# Patient Record
Sex: MALE | Race: WHITE | NOT HISPANIC OR LATINO | Employment: UNEMPLOYED | URBAN - METROPOLITAN AREA
[De-identification: names, ages, dates, MRNs, and addresses within clinical notes are randomized per-mention and may not be internally consistent; named-entity substitution may affect disease eponyms.]

---

## 2023-10-27 ENCOUNTER — OFFICE VISIT (OUTPATIENT)
Dept: PULMONOLOGY | Facility: CLINIC | Age: 58
End: 2023-10-27
Payer: MEDICAID

## 2023-10-27 VITALS
SYSTOLIC BLOOD PRESSURE: 148 MMHG | TEMPERATURE: 97.5 F | WEIGHT: 128.2 LBS | BODY MASS INDEX: 21.36 KG/M2 | HEIGHT: 65 IN | OXYGEN SATURATION: 98 % | HEART RATE: 77 BPM | DIASTOLIC BLOOD PRESSURE: 89 MMHG

## 2023-10-27 DIAGNOSIS — R91.1 LUNG NODULE: ICD-10-CM

## 2023-10-27 DIAGNOSIS — G47.34 NOCTURNAL HYPOXIA: ICD-10-CM

## 2023-10-27 DIAGNOSIS — J43.9 PULMONARY EMPHYSEMA, UNSPECIFIED EMPHYSEMA TYPE (MULTI): Primary | ICD-10-CM

## 2023-10-27 DIAGNOSIS — F17.219 CIGARETTE NICOTINE DEPENDENCE WITH NICOTINE-INDUCED DISORDER: ICD-10-CM

## 2023-10-27 DIAGNOSIS — R63.4 WEIGHT LOSS: ICD-10-CM

## 2023-10-27 DIAGNOSIS — R06.09 DYSPNEA ON EXERTION: ICD-10-CM

## 2023-10-27 PROBLEM — R07.9 CHEST PAIN: Status: ACTIVE | Noted: 2023-10-27

## 2023-10-27 PROBLEM — G47.10 HYPERSOMNIA: Status: ACTIVE | Noted: 2023-10-27

## 2023-10-27 PROBLEM — M79.89 SWELLING OF LOWER EXTREMITY: Status: ACTIVE | Noted: 2023-03-15

## 2023-10-27 PROBLEM — R93.89 ABNORMAL CHEST CT: Status: ACTIVE | Noted: 2023-10-27

## 2023-10-27 PROBLEM — R10.31 SEVERE RIGHT GROIN PAIN: Status: ACTIVE | Noted: 2023-10-27

## 2023-10-27 PROBLEM — M75.82 ROTATOR CUFF TENDINITIS, LEFT: Status: ACTIVE | Noted: 2023-10-27

## 2023-10-27 PROBLEM — J98.4 CAVITARY LESION OF LUNG: Status: ACTIVE | Noted: 2023-10-27

## 2023-10-27 PROBLEM — R06.02 SHORTNESS OF BREATH AT REST: Status: ACTIVE | Noted: 2023-10-27

## 2023-10-27 PROBLEM — G47.30 SLEEP-DISORDERED BREATHING: Status: ACTIVE | Noted: 2023-10-27

## 2023-10-27 PROBLEM — M54.32 SCIATICA OF LEFT SIDE: Status: ACTIVE | Noted: 2023-10-27

## 2023-10-27 PROBLEM — G47.33 OSA (OBSTRUCTIVE SLEEP APNEA): Status: ACTIVE | Noted: 2023-10-27

## 2023-10-27 PROBLEM — F17.210 CIGARETTE SMOKER: Status: ACTIVE | Noted: 2023-10-27

## 2023-10-27 PROBLEM — F17.200 NICOTINE ADDICTION: Status: ACTIVE | Noted: 2023-10-27

## 2023-10-27 PROCEDURE — 99215 OFFICE O/P EST HI 40 MIN: CPT | Performed by: INTERNAL MEDICINE

## 2023-10-27 RX ORDER — DIAZEPAM 2 MG/1
2 TABLET ORAL DAILY PRN
COMMUNITY
Start: 2023-10-24

## 2023-10-27 RX ORDER — KETOROLAC TROMETHAMINE 5 MG/ML
SOLUTION OPHTHALMIC
COMMUNITY
Start: 2021-05-05 | End: 2024-02-29 | Stop reason: WASHOUT

## 2023-10-27 RX ORDER — MONTELUKAST SODIUM 10 MG/1
10 TABLET ORAL NIGHTLY
Qty: 30 TABLET | Refills: 1 | Status: SHIPPED | OUTPATIENT
Start: 2023-10-27 | End: 2023-12-26

## 2023-10-27 RX ORDER — MIRTAZAPINE 15 MG/1
15 TABLET, FILM COATED ORAL NIGHTLY
COMMUNITY
Start: 2023-10-24 | End: 2024-02-29 | Stop reason: WASHOUT

## 2023-10-27 RX ORDER — IBUPROFEN 600 MG/1
600 TABLET ORAL 3 TIMES DAILY PRN
COMMUNITY
End: 2024-02-12 | Stop reason: SDUPTHER

## 2023-10-27 RX ORDER — ROPINIROLE 1 MG/1
1 TABLET, FILM COATED ORAL 3 TIMES DAILY
COMMUNITY
Start: 2023-05-27 | End: 2024-02-29 | Stop reason: WASHOUT

## 2023-10-27 RX ORDER — MICONAZOLE NITRATE 2 %
CREAM (GRAM) TOPICAL
COMMUNITY
Start: 2023-06-13 | End: 2024-02-29 | Stop reason: WASHOUT

## 2023-10-27 RX ORDER — MONTELUKAST SODIUM 10 MG/1
1 TABLET ORAL NIGHTLY
COMMUNITY
End: 2023-10-27 | Stop reason: SDUPTHER

## 2023-10-27 RX ORDER — DIAZEPAM 5 MG/1
TABLET ORAL
COMMUNITY
Start: 2022-09-15 | End: 2024-02-29 | Stop reason: WASHOUT

## 2023-10-27 RX ORDER — NITROGLYCERIN 0.4 MG/1
TABLET SUBLINGUAL
COMMUNITY

## 2023-10-27 RX ORDER — TIOTROPIUM BROMIDE 18 UG/1
1 CAPSULE ORAL; RESPIRATORY (INHALATION)
COMMUNITY

## 2023-10-27 RX ORDER — IBUPROFEN 800 MG/1
1 TABLET ORAL
COMMUNITY
Start: 2023-10-24

## 2023-10-27 RX ORDER — GABAPENTIN 300 MG/1
CAPSULE ORAL
COMMUNITY
Start: 2022-04-12 | End: 2024-02-29 | Stop reason: WASHOUT

## 2023-10-27 RX ORDER — ALBUTEROL SULFATE 0.83 MG/ML
2.5 SOLUTION RESPIRATORY (INHALATION) 4 TIMES DAILY PRN
COMMUNITY
Start: 2023-02-17 | End: 2024-01-31 | Stop reason: SDUPTHER

## 2023-10-27 RX ORDER — BUDESONIDE AND FORMOTEROL FUMARATE DIHYDRATE 80; 4.5 UG/1; UG/1
2 AEROSOL RESPIRATORY (INHALATION) 2 TIMES DAILY
COMMUNITY
End: 2023-10-27 | Stop reason: SDUPTHER

## 2023-10-27 RX ORDER — ALBUTEROL SULFATE 90 UG/1
1-2 AEROSOL, METERED RESPIRATORY (INHALATION)
COMMUNITY
End: 2023-10-27 | Stop reason: SDUPTHER

## 2023-10-27 RX ORDER — ACETAMINOPHEN 500 MG
2 TABLET ORAL 3 TIMES DAILY
COMMUNITY
Start: 2022-02-16 | End: 2024-03-11 | Stop reason: HOSPADM

## 2023-10-27 RX ORDER — METHOCARBAMOL 500 MG/1
1 TABLET, FILM COATED ORAL 3 TIMES DAILY PRN
COMMUNITY
Start: 2022-04-12 | End: 2024-02-29 | Stop reason: WASHOUT

## 2023-10-27 RX ORDER — CITALOPRAM 10 MG/1
10 TABLET ORAL DAILY
COMMUNITY
Start: 2023-05-26 | End: 2024-02-29 | Stop reason: WASHOUT

## 2023-10-27 RX ORDER — BUDESONIDE AND FORMOTEROL FUMARATE DIHYDRATE 160; 4.5 UG/1; UG/1
2 AEROSOL RESPIRATORY (INHALATION)
COMMUNITY
End: 2023-10-27 | Stop reason: SDUPTHER

## 2023-10-27 RX ORDER — DM/P-EPHED/ACETAMINOPH/DOXYLAM 30-7.5/3
LIQUID (ML) ORAL
COMMUNITY
End: 2024-02-29 | Stop reason: WASHOUT

## 2023-10-27 RX ORDER — ALBUTEROL SULFATE 90 UG/1
1-2 AEROSOL, METERED RESPIRATORY (INHALATION) EVERY 6 HOURS PRN
Qty: 18 G | Refills: 3 | Status: SHIPPED | OUTPATIENT
Start: 2023-10-27 | End: 2024-01-31 | Stop reason: SDUPTHER

## 2023-10-27 RX ORDER — BUDESONIDE AND FORMOTEROL FUMARATE DIHYDRATE 160; 4.5 UG/1; UG/1
2 AEROSOL RESPIRATORY (INHALATION)
Qty: 30 EACH | Refills: 3 | Status: SHIPPED | OUTPATIENT
Start: 2023-10-27 | End: 2024-02-29

## 2023-10-27 RX ORDER — GABAPENTIN 100 MG/1
CAPSULE ORAL
COMMUNITY
Start: 2022-03-09 | End: 2024-02-29 | Stop reason: WASHOUT

## 2023-10-27 ASSESSMENT — ENCOUNTER SYMPTOMS
APPETITE CHANGE: 0
NERVOUS/ANXIOUS: 0
CONFUSION: 0
CHEST TIGHTNESS: 1
PALPITATIONS: 0
WHEEZING: 1
DYSPHORIC MOOD: 0
RHINORRHEA: 0
NAUSEA: 0
BACK PAIN: 1
ARTHRALGIAS: 1
UNEXPECTED WEIGHT CHANGE: 1
EYE ITCHING: 0
SINUS PAIN: 0
EYE REDNESS: 0
SEIZURES: 0
COUGH: 1
SHORTNESS OF BREATH: 1
EYE PAIN: 0
CONSTIPATION: 0
LIGHT-HEADEDNESS: 1
HEMATURIA: 0
FREQUENCY: 0
WOUND: 0
FATIGUE: 1
NECK PAIN: 1
DYSURIA: 0
ABDOMINAL PAIN: 0
SORE THROAT: 0
HEADACHES: 0
CHILLS: 0
MYALGIAS: 0
FEVER: 0
DIARRHEA: 0
VOMITING: 0
DIZZINESS: 0
SINUS PRESSURE: 0

## 2023-10-27 ASSESSMENT — PAIN SCALES - GENERAL: PAINLEVEL: 0-NO PAIN

## 2023-10-27 NOTE — PATIENT INSTRUCTIONS
Mr. Crawley,     It was a pleasure to talk to you today. We discussed the followings:     1. Lung mass/nodule: given it has minimal activity on PET scan and it was stable in size in your repeat chest CT for a while. However your repeat chest CT in 9/2022 showed enlarging nodule. You subsequently had a biopsy done that did not show any cancer. For now we will monitor it with chest CTs. Next in October 2023.      2. COPD: your breathing test showed that you have moderate COPD. Given your symptoms are not well controlled, please make sure you will attend the pulmonary rehab. Your six minute walk test result showed that you did not qualify for oxygen supplementation. Please continue Symbicort, Spiriva, Montelukast and Ventolin as needed.      3. Smoking: please make every effort to quit smoking. To help with that, I started you on nicotine gum.      4. Chest pain: please make sure you are following up with a heart doctor.     5. Low oxygen level: please continue to wear your supplemental oxygen at nights.      6. Weight loss: discuss this with your primary care.     Please return to the clinic in 3 months.

## 2023-10-27 NOTE — PROGRESS NOTES
Subjective   58 YOM with h/o hernia, pulmonary emphysema/blebs s/p resection in 2000, now is being seen for lung nodules.   Patient had not seen a doctor in ten years until he saw Dr. Barboza. Had screening chest CT done in Sep 2020 that showed a ? lung nodules, subsequently had PET done that showed mild activity, for that is referred to my office. Patient subsequently had more work up done as detailed below.   Last seen 4 months ago, no major events since then.   Patient had nocturnal pulse oximetry done, qualified for nocturnal O2 therapy. Using it every night, with improvement of his symptoms.   Currently on Symbicort, Spiriva and albuterol. Continues to use his albuterol on several times a day.   Overall is feeling the same as the last visit.   CC SOB, both at rest and with activity, but mostly activity. LEDEZMA after walking 50 feet without oxygen or climbing 3-4 steps. +ve orthopnea. +ve PND, several times every night. No LE edema. LEDEZMA started 2019, gradual onset, progressive. Associated with chest tightness and wheezing. LEDEZMA worse since the last visit.   +ve wheezing, several times a day. States wheezing improves with albuterol.  Wheezing stable since the last visit.   +ve chronic cough, moderate, mostly in the morning, productive of yellow sputum, no h/o hemoptysis. Cough and sputum for years without recent changes.      Having problem falling sleep and staying sleep. +ve snoring, Does not feel rested in am.      Exposures: quit smoking 7/2021, but now smokes 2 cigarettes. Around 40-60 pack/year h/o smoking. 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     Review of Systems   Constitutional:  Positive for fatigue and unexpected weight change. Negative for appetite change, chills and fever.        +ve weight loss.    HENT:  Negative for congestion, ear pain, postnasal drip, rhinorrhea, sinus pressure, sinus pain and sore throat.    Eyes:  Negative for pain, redness, itching and visual disturbance.    Respiratory:  Positive for cough, chest tightness, shortness of breath and wheezing.    Cardiovascular:  Positive for chest pain. Negative for palpitations and leg swelling.   Gastrointestinal:  Negative for abdominal pain, constipation, diarrhea, nausea and vomiting.   Genitourinary:  Negative for dysuria, frequency and hematuria.   Musculoskeletal:  Positive for arthralgias, back pain and neck pain. Negative for myalgias.   Skin:  Negative for pallor, rash and wound.   Neurological:  Positive for light-headedness. Negative for dizziness, seizures, syncope and headaches.   Psychiatric/Behavioral:  Negative for confusion and dysphoric mood. The patient is not nervous/anxious.          Current Outpatient Medications   Medication Instructions    acetaminophen (Tylenol) 500 mg tablet 2 tablets, oral, 3 times daily    albuterol 2.5 mg, nebulization, 4 times daily PRN    budesonide-formoteroL (Symbicort) 160-4.5 mcg/actuation inhaler 2 puffs, inhalation, 2 times daily RT, RINSE MOUTH AFTER USE. GO TO EMERGENCY ROOM IF CHEST PAIN OR PALPITATIONS OR FEELING WORSE OR CON<BR>    budesonide-formoteroL (Symbicort) 80-4.5 mcg/actuation inhaler 2 puffs, inhalation, 2 times daily    citalopram (CELEXA) 10 mg, oral, Daily    diazePAM (Valium) 5 mg tablet oral, TAKE 1 TABLET 1 HOUR PRIOR TO PROCEDURE.<BR>    diazePAM (VALIUM) 2 mg, oral, Daily PRN    gabapentin (Neurontin) 100 mg capsule oral    gabapentin (Neurontin) 300 mg capsule oral     mg tablet 1 tablet, oral, 3 times daily with meals, prn     mg, oral, 3 times daily PRN    ketorolac (Acular) 0.5 % ophthalmic solution ophthalmic (eye)    methocarbamol (Robaxin) 500 mg tablet 1 tablet, oral, 3 times daily PRN    mirtazapine (REMERON) 15 mg, oral, Nightly    montelukast (Singulair) 10 mg tablet 1 tablet, oral, Nightly, STOP IF MOOD CHANGES.    nicotine polacrilex (Commit) 2 mg lozenge ALLOW 1 LOZENGE TO DISSOLVE SLOWLY IN MOUTH AS DIRECTED    nicotine  polacrilex (Nicorette) 2 mg gum CHEW 1 PIECE SLOWLY AND INTERMITTENTLY FOR 30 MINUTES REPEAT EVERY 1 TO 2 HOURS; MAXIMUM OF 24 PIECES A DAY    nitroglycerin (Nitrostat) 0.4 mg SL tablet DISSOLVE ONE TABLET UNDER TONGUE every 5 minutes for up to 3 doses as needed for chest pain. call 911 if pain persists.<BR>    rOPINIRole (REQUIP) 1 mg, oral, 3 times daily    Spiriva with HandiHaler 18 mcg inhalation capsule 1 capsule, inhalation, Daily RT, GO TO ER IF VISUAL/EYE PAIN OR URINARY RETENTION OR PALPITATIONS OR CHEST PAIN OR    Ventolin HFA 90 mcg/actuation inhaler 1-2 puffs, inhalation, EVERY 4 TO 6 HOURS AS NEEDED<BR>      Objective     Physical Exam  Constitutional:       Appearance: Normal appearance.      Comments: Thin.    HENT:      Head: Normocephalic and atraumatic.      Nose:      Comments: On RA.      Mouth/Throat:      Mouth: Mucous membranes are moist.      Comments: Mallampati 2-3  Eyes:      Extraocular Movements: Extraocular movements intact.      Pupils: Pupils are equal, round, and reactive to light.   Cardiovascular:      Rate and Rhythm: Normal rate and regular rhythm.      Heart sounds: Normal heart sounds. No murmur heard.     No friction rub. No gallop.   Pulmonary:      Effort: Pulmonary effort is normal. No respiratory distress.      Breath sounds: No wheezing or rales.      Comments: Clear lung fields b/l with fair air entry.   Abdominal:      General: There is no distension.      Palpations: Abdomen is soft.      Tenderness: There is no abdominal tenderness. There is no guarding.   Musculoskeletal:         General: No swelling or tenderness. Normal range of motion.      Cervical back: Normal range of motion and neck supple. No rigidity or tenderness.      Right lower leg: No edema.      Left lower leg: No edema.   Lymphadenopathy:      Cervical: No cervical adenopathy.   Skin:     General: Skin is warm and dry.      Coloration: Skin is not jaundiced.   Neurological:      General: No focal  "deficit present.      Mental Status: He is alert and oriented to person, place, and time.      Cranial Nerves: No cranial nerve deficit.      Motor: No weakness.   Psychiatric:         Mood and Affect: Mood normal.         Behavior: Behavior normal.     Last Recorded Vitals  Blood pressure 148/89, pulse 77, temperature 36.4 °C (97.5 °F), height 1.651 m (5' 5\"), weight 58.2 kg (128 lb 3.2 oz), SpO2 98 %.    Relevant Results  I personally reviewed the images for the following studies (official reads as below):    CT from 4/10/2023 that showed: interval decrease in RUL cavitary lesion   CT from 3/2023 that showed: interval decrease in the RUL cavitary lesion and subpleural mass like lesion   CT from 2022 that showed worsening of the RUL cavitary lesion     6 MWT from 2022 reviewed, walked 340/477 (55.2%), no desaturation on RA.   I personally interpreted the PFT from 2022 that showed: FEV1/FVC 39%, FEV1 40-->47% (19%) with bronchodilator response, TLC 87%, RV/%, DLCO 56-->70%.   Stress test in 10/2022 negative.  Sleep study result from 2022 reviewed that showed RDI 3% 18.4%, RDI 4% 9.4%, emelia O2 80s.    Assessment/Plan     58 YOM with h/o bullectomy, smoking, now is being seen for LEDEZMA and lung mass/nodule.      1. Lung mass/nodule: ? scar. Bullectomy both lungs in . 2.9 x 1.7 cm right apex mass/speculated and an 8 mm RUL nodule, both only mild uptake on PET. Patient is high risk given h/o smoking and recent h/o weight loss. Also father  of lung cancer. CT chest done, that showed stable nodule and likely scar tissue. Repeat chest CT in 2021 showed enlarging RUL nodule. PET increased uptake, s/p bronch + biopsy that showed chronic inflammation with occasional not necrotizing granuloma. Patient had repeat chest CT done in Sep 2022 that showed increased in size of the cavity. Subsequently was referred to thoracic surgery who ordered a CT guided biopsy which was done in 2022. Cultures +ve " for pseudomonas, but otherwise negative. Cytology negative. Pathology showed dense fibroadipose tissue with chronic inflammation. Was treated with Levaquin for 2 weeks. Repeat CTs in March and April 2023 showed continuous improvement.    will repeat chest CT now.      2. COPD: PFT showed moderate COPD. Significant air trapping, marked LEDEZMA, on LAMA + LABA + ICS and rescue inhaler. Rescue inhaler need is now very frequent. Symptoms stable since the last visit. CAT today 32. Category B. Started on Anoro previously, but could not afford due to high Co-payment. Now back on Symbicort + Spiriva.        Continue Symbicort, Spiriva and Singulair   continue Ventolin as needed.    6 MWT done, no desaturation   nocturnal pulse O2 done, qualified for nocturnal oxygen, advised to continue to wear   pulmonary rehab referral done, has not attended yet. Will re-order.      3. Smoking: quit smoking July 2021, but now started again. Tried nicotine patch without any effect. Smoking two cigarettes a day currently    smoking cessation done, total time 3 min   started on nicotine gums, will continue      4. LEDEZMA: likely from COPD, had cardiac work up done.    2D echocardiogram done, only showed dilated LA   stress test in 10/2022 also negative.      5. Weight loss: ? cause, pulmonary work up negative for cancer.    advised to discuss this with his primary care.      RTC in 2-3 months.     Sully Carlin MD

## 2023-11-13 ENCOUNTER — HOSPITAL ENCOUNTER (OUTPATIENT)
Dept: RADIOLOGY | Facility: HOSPITAL | Age: 58
Discharge: HOME | End: 2023-11-13
Payer: MEDICAID

## 2023-11-13 DIAGNOSIS — F17.219 CIGARETTE NICOTINE DEPENDENCE WITH NICOTINE-INDUCED DISORDER: ICD-10-CM

## 2023-11-13 PROCEDURE — 71250 CT THORAX DX C-: CPT | Performed by: RADIOLOGY

## 2023-11-13 PROCEDURE — 71250 CT THORAX DX C-: CPT

## 2023-11-14 ENCOUNTER — TELEPHONE (OUTPATIENT)
Dept: PULMONOLOGY | Facility: CLINIC | Age: 58
End: 2023-11-14
Payer: MEDICAID

## 2023-11-14 ENCOUNTER — ANCILLARY PROCEDURE (OUTPATIENT)
Dept: RADIOLOGY | Facility: CLINIC | Age: 58
End: 2023-11-14
Payer: MEDICAID

## 2023-11-14 DIAGNOSIS — M25.512 PAIN IN LEFT SHOULDER: ICD-10-CM

## 2023-11-14 PROCEDURE — 73030 X-RAY EXAM OF SHOULDER: CPT | Mod: LT,FY

## 2023-11-14 PROCEDURE — 73030 X-RAY EXAM OF SHOULDER: CPT | Mod: LEFT SIDE | Performed by: RADIOLOGY

## 2023-11-14 NOTE — TELEPHONE ENCOUNTER
Davi from [facility] is requesting an order for nocturnal testing for patient. It is time for his annual renewal of oxygen and this is a requirement. His last appointment with you was on 10/27/23. Once you  place the order in [facility] I will get it sent over to the company.

## 2023-11-15 DIAGNOSIS — G47.34 NOCTURNAL HYPOXIA: Primary | ICD-10-CM

## 2023-12-19 ENCOUNTER — OFFICE VISIT (OUTPATIENT)
Dept: ORTHOPEDIC SURGERY | Facility: HOSPITAL | Age: 58
End: 2023-12-19
Payer: MEDICAID

## 2023-12-19 VITALS — WEIGHT: 130 LBS | BODY MASS INDEX: 21.66 KG/M2 | HEIGHT: 65 IN

## 2023-12-19 DIAGNOSIS — M25.512 LEFT SHOULDER PAIN, UNSPECIFIED CHRONICITY: Primary | ICD-10-CM

## 2023-12-19 DIAGNOSIS — M75.102 TEAR OF LEFT ROTATOR CUFF, UNSPECIFIED TEAR EXTENT, UNSPECIFIED WHETHER TRAUMATIC: ICD-10-CM

## 2023-12-19 PROCEDURE — 99204 OFFICE O/P NEW MOD 45 MIN: CPT | Performed by: NURSE PRACTITIONER

## 2023-12-19 PROCEDURE — 99214 OFFICE O/P EST MOD 30 MIN: CPT | Performed by: NURSE PRACTITIONER

## 2023-12-19 RX ORDER — MELOXICAM 7.5 MG/1
7.5 TABLET ORAL DAILY
Qty: 30 TABLET | Refills: 0 | Status: SHIPPED | OUTPATIENT
Start: 2023-12-19 | End: 2024-01-18

## 2023-12-19 ASSESSMENT — PAIN - FUNCTIONAL ASSESSMENT: PAIN_FUNCTIONAL_ASSESSMENT: 0-10

## 2023-12-19 ASSESSMENT — PAIN SCALES - GENERAL: PAINLEVEL_OUTOF10: 10 - WORST POSSIBLE PAIN

## 2023-12-19 ASSESSMENT — PAIN DESCRIPTION - DESCRIPTORS: DESCRIPTORS: ACHING;NUMBNESS;STABBING

## 2023-12-19 NOTE — PROGRESS NOTES
Provider Impression/Assessment:  Patient with increased left shoulder pain and dysfunction for the last 8 weeks with a history of left shoulder issues. They now have acute increased pain with a  change in his shoulder mobility.     Patient Discussion/Plan:  At this point we had a long discussion about their options. Even at their age we know that early detection and treatment of rotator cuff tears results in better outcomes both surgically and clinically. They have weakness that is increased over the last 8 weeks and have not regained range of motion. We could consider cortisone injections but we do not want to miss an acute rotator cuff tear. We know from the literature that outcomes and healing rates are much better when done immediately. As a result, we are recommending an  MRI to evaluate the rotator cuff on their left side. This will allow us to better plan for what procedure and treatment would be best for the patient, whether it be a non-operative or operative approach.     We do not want to miss anything structurally around his shoulder and his rotator cuff. Patient is going to continue exercises at this time while waiting to schedule advanced imaging. In the meantime, they are encouraged to do exercises at home for the shoulder. They were given our hand out with exercises today. These exercises should be done every day. They are aware to follow up with our office after the MRI is completed and we will formulate a plan. We will discuss options on the phone after imaging completed. They know to call our office once the imaging is complete.    We recommended that you completely quit smoking for both your pain and healing and your health in general. Patient understands that he should stop smoking prior to any consideration of a surgery.    All of their questions were answered today to their satisfaction and are in agreement with the above plan. They know how to reach the office if there are any additional  questions prior to being seen again. Patient was discussed with Dr Herrera and he agrees with the above plan.     Should you have any questions or concerns after your visit today, please do not hesitate to call the office at [phone]. We strive to give you high-quality, patient-centered, collaborative care and I am honored to be a part of your health care team.      Should you have any questions or concerns after your visit today, please do not hesitate to call the office at [phone]. We strive to give you high-quality, patient-centered, collaborative care and I am honored to be a part of your health care team.    ----------------------------------------------------------------------------------------------------------  CHIEF COMPLAINT:  NPV LEFT SHOULDER     History of Present Illness:  Alber Young is a pleasant 58 y.o. RIGHT hand dominant male who currently works as a  and worked in [employer] for many years prior to that. He is presenting to clinic with left shoulder pain for the last 2 MONTHS. Pain first noticed 5 years ago. He denies recent trauma. Cannot recall every having a specific injury. When he first noticed the pain he had injections for his shoulder which did help initially. He has used Icy/Hot topical cream. Taken Ibuprofen.        ALBER YOUNG is in clinic today with left shoulder pain. He has been having this pain for the past 5 years and it has gradually worsened over time; no specific MYRON. Alber states that he is unable to sleep due to the pain. He is lacking ROM and strength in his left arm and reports intermittent paresthesia into his left hand. Alber's PCP ordered an XR for his left shoulder 11/14/23. In the past, Alber has had CSI and states that they have provided some pain relief. At this point, he would like to discuss surgery because he believes that his pain is no longer manageable. He denies any recent trauma or injury to the shoulder.     They HAVE NOT  tried any recent therapy. The shoulder has not really gotten any better. 70% of a normal shoulder.  7 out of 10 pain on average. 9 out of 10 at their worst. DOES wake them from sleep every night over the last 2 months now. DOES NOT feel unstable. DOES NOT feel stiff. No numbness or tingling. Taking over-the counter medications for pain relief. The opposite shoulder is normal, without complaint. Denies fevers or chills.     Past medical history, surgical history, social history and family history were all reviewed and are per the patient's health history questionnaire form which was filled out by the patient today. I have reviewed and it will be scanned into the patient chart. Family history was discussed and is not pertinent to the presenting problem.     Smoking Status: DAILY  Diabetic: NONE  Thyroid Disorder: NONE  BMI: 21    Allergies:  Allergies   Allergen Reactions    Morphine Itching     Past Medical History:   Diagnosis Date    Other specified health status 08/27/2020    No pertinent past medical history       Past Surgical History:   Procedure Laterality Date    CT GUIDED PERCUTANEOUS BIOPSY LUNG  11/10/2022    CT GUIDED PERCUTANEOUS BIOPSY LUNG 11/10/2022 [facility]    OTHER SURGICAL HISTORY  09/27/2022    Bronchoscopy     Social History     Socioeconomic History    Marital status:      Spouse name: Not on file    Number of children: Not on file    Years of education: Not on file    Highest education level: Not on file   Occupational History    Not on file   Tobacco Use    Smoking status: Every Day     Packs/day: .25     Types: Cigarettes    Smokeless tobacco: Never   Vaping Use    Vaping Use: Never used   Substance and Sexual Activity    Alcohol use: Yes    Drug use: Never    Sexual activity: Not on file   Other Topics Concern    Not on file   Social History Narrative    Not on file     Social Determinants of Health     Financial Resource Strain: Not on file   Food Insecurity: Not on file    Transportation Needs: Not on file   Physical Activity: Not on file   Stress: Not on file   Social Connections: Not on file   Intimate Partner Violence: Not on file   Housing Stability: Not on file     Review of Systems:   Review of systems for all 14 systems is negative for complaint except for the above noted       findings in the history of present illness.    Diagnoses/Problems:  Left shoulder pain  Left shoulder rotator cuff pathology    Physical Examination:  Patient is a well-developed, well nourished male in no acute distress. Awake, alert and oriented x3, with appropriate mood. Breathes with normal chest rises. Eye exam reveals round pupils with clear sclera. Gait is steady.    Patient had full range of motion of bilateral elbows. 5 out of 5 bilateral wrist flexion and extension. Thumb extension positive bilaterally. Sensation was intact to light touch to median, ulnar, radial, and axillary nerves bilaterally. Positive radial pulse bilaterally.     Examination of the right shoulder today reveals the skin to be intact. There is no sign any atrophy lesions or abrasions. There is no pain to palpation of the bony prominences. Range of motion of the right shoulder revealed 0-170° of forward elevation. 0-60° of external rotation. Internal rotation was to T12. Provocative maneuvers today were negative.    Examination of the left shoulder reveals the skin to be intact. No sign any atrophy lesions or abrasions. There is MODERATE tenderness with palpation along the anterior chromium and greater tuberosity.  Exam is limited secondary to pain and guarding a fair amount. Active range of motion is 90° of forward elevation, passively correctable with pain past 90 degrees. 60° of external rotation with anterior palpable pain. Internal rotation was to T12. Abduction to 90° with pain. Acromioclavicular joint is asymptomatic. Bicipital groove POSITIVE. Speeds test POSITIVE. Jobes test POSITIVE. Neers sign POSITIVE. Hortencia  sign POSITIVE.   Provocative maneuvers of the rotator cuff were POSITIVE. External resistance 2/5. Internal resistance 3/5.     Results/Imaging:  Independent review of the imaging today from 11/14/23 left shoulder shows no signs of any fracture, dislocation or arthritis. I personally spent time viewing digital images of the radiology testing with the patient. I explained the results to the patient, along with suggested explanation for follow up recommendations based on testing and clinical results.       *While intending to generate a timely document that accurately reflects the content of the visit, no guarantee can be provided that every grammatical or spelling mistake has been or will be identified or corrected. Thank you for your understanding.

## 2024-01-02 ENCOUNTER — APPOINTMENT (OUTPATIENT)
Dept: RADIOLOGY | Facility: HOSPITAL | Age: 59
End: 2024-01-02
Payer: MEDICAID

## 2024-01-03 ENCOUNTER — HOSPITAL ENCOUNTER (OUTPATIENT)
Dept: RADIOLOGY | Facility: HOSPITAL | Age: 59
Discharge: HOME | End: 2024-01-03
Payer: MEDICAID

## 2024-01-03 DIAGNOSIS — M25.512 LEFT SHOULDER PAIN, UNSPECIFIED CHRONICITY: ICD-10-CM

## 2024-01-03 DIAGNOSIS — M75.102 TEAR OF LEFT ROTATOR CUFF, UNSPECIFIED TEAR EXTENT, UNSPECIFIED WHETHER TRAUMATIC: ICD-10-CM

## 2024-01-03 PROCEDURE — 73221 MRI JOINT UPR EXTREM W/O DYE: CPT | Mod: LT

## 2024-01-09 ENCOUNTER — OFFICE VISIT (OUTPATIENT)
Dept: ORTHOPEDIC SURGERY | Facility: HOSPITAL | Age: 59
End: 2024-01-09
Payer: MEDICAID

## 2024-01-09 ENCOUNTER — HOSPITAL ENCOUNTER (OUTPATIENT)
Facility: HOSPITAL | Age: 59
Setting detail: OUTPATIENT SURGERY
End: 2024-01-09
Attending: ORTHOPAEDIC SURGERY | Admitting: ORTHOPAEDIC SURGERY
Payer: MEDICAID

## 2024-01-09 VITALS — BODY MASS INDEX: 21.66 KG/M2 | HEIGHT: 65 IN | WEIGHT: 130 LBS

## 2024-01-09 DIAGNOSIS — G89.18 ACUTE POST-OPERATIVE PAIN: ICD-10-CM

## 2024-01-09 DIAGNOSIS — Z01.818 PREOP EXAMINATION: ICD-10-CM

## 2024-01-09 DIAGNOSIS — M75.102 TEAR OF LEFT ROTATOR CUFF, UNSPECIFIED TEAR EXTENT, UNSPECIFIED WHETHER TRAUMATIC: Primary | ICD-10-CM

## 2024-01-09 PROCEDURE — 99214 OFFICE O/P EST MOD 30 MIN: CPT | Performed by: NURSE PRACTITIONER

## 2024-01-09 PROCEDURE — L3670 SO ACRO/CLAV CAN WEB PRE OTS: HCPCS | Performed by: NURSE PRACTITIONER

## 2024-01-09 RX ORDER — OXYCODONE AND ACETAMINOPHEN 5; 325 MG/1; MG/1
1 TABLET ORAL EVERY 6 HOURS PRN
Qty: 20 TABLET | Refills: 0 | Status: SHIPPED | OUTPATIENT
Start: 2024-01-09 | End: 2024-01-15

## 2024-01-09 ASSESSMENT — PAIN - FUNCTIONAL ASSESSMENT: PAIN_FUNCTIONAL_ASSESSMENT: 0-10

## 2024-01-09 ASSESSMENT — PAIN SCALES - GENERAL: PAINLEVEL_OUTOF10: 10 - WORST POSSIBLE PAIN

## 2024-01-09 ASSESSMENT — PAIN DESCRIPTION - DESCRIPTORS: DESCRIPTORS: ACHING

## 2024-01-09 NOTE — PROGRESS NOTES
Provider Impression/Assessment:  Patient with left shoulder pain consistent with small, full thickness rotator cuff tear and biceps tendinitis. Confirmed with recent MRI and clinical evaluation.       Patient Discussion/Plan:  VILMA is status post injury of their left shoulder. Recent MRI reveals rotator cuff tear. Patient had a normal shoulder prior to injury. Through the most recent literature we know that early detection and treatment of rotator cuff tears have a better outcome and function long term. They have failed conservative management at this point. The risks, benefits and alternatives of shoulder surgery were discussed at length with Dr Herrera today. The risks of surgery are infection, dislocation, nerve injury, blood loss. The benefits are pain relief and restoration of function. The patient verbalize an understanding of the risks, benefits, alternatives and the expected outcomes and wishes to proceed with elective shoulder surgery at this time. The rehabilitation after surgery was discussed at length today. Rehabilitation after rotator cuff repair lasting a total of 5-6 months after surgery. Lifting is slowly progressed. They will be in a sling for the first month. The following 2-3 months would work on range of motion. No strengthening until 3 months from surgery.     PLAN is for left shoulder arthroscopic decompression, debridement, rotator cuff repair with patch implant augmentation and open biceps tenodesis.    At this point we will plan for surgery on 2/21/24 at [facility]. We gave the patient a handout today on arthroscopic rotator cuff repair. This will be out-patient surgery. They were fitted for the post operative sling today. They will need to be seen and cleared by the Anesthesia team prior to surgery date.      We again recommend that you completely quit smoking for both your pain and healing and your health in general. Patient understands that he should stop smoking prior to  any consideration of a surgery.     The patient was seen and evaluated by Dr. Herrera today. The history, physical exam, explanation of the disorder, and discussion of treatment options was performed by Dr. Herrera with the patient. All of their questions were answered today to their satisfaction and are in agreement with the above plan. They know how to reach the office if there are any additional questions prior to being seen again.      Should you have any questions or concerns after your visit today, please do not hesitate to call the office at [phone]. We strive to give you high-quality, patient-centered, collaborative care and I am honored to be a part of your health care team.    ----------------------------------------------------------------------------------------------------------  CHIEF COMPLAINT:  FUV LEFT SHOULDER   Pre-operative evaluation     History of Present Illness:  Alber Young is a pleasant 58 y.o. RIGHT hand dominant male who currently works as a  and worked in [employer] for many years prior to that. He is presenting to clinic with left shoulder pain for the last 2 MONTHS. Pain first noticed 5 years ago. He denies recent trauma. Cannot recall every having a specific injury. When he first noticed the pain he had injections for his shoulder which did help initially. He has used Icy/Hot topical cream. Taken Ibuprofen.         12/19/23 - ALBER YOUNG is in clinic today with left shoulder pain. He has been having this pain for the past 5 years and it has gradually worsened over time; no specific MYRON. Alber states that he is unable to sleep due to the pain. He is lacking ROM and strength in his left arm and reports intermittent paresthesia into his left hand. Alber's PCP ordered an XR for his left shoulder 11/14/23. In the past, Alber has had CSI and states that they have provided some pain relief. At this point, he would like to discuss surgery because he believes that  his pain is no longer manageable. He denies any recent trauma or injury to the shoulder. They HAVE NOT tried any recent therapy. The shoulder has not really gotten any better. 70% of a normal shoulder.  7 out of 10 pain on average. 9 out of 10 at their worst. DOES wake them from sleep every night over the last 2 months now. DOES NOT feel unstable. DOES NOT feel stiff. No numbness or tingling. Taking over-the counter medications for pain relief. The opposite shoulder is normal, without complaint. Denies fevers or chills.     1/09/24  VILMA YOUNG returns to clinic for a preoperative planning visit. He is accompanied by his fiance today. He would like to schedule surgery for his left shoulder. He denies any new injury to his shoulder since his last visit. He has completed MRI of left shoulder since last visit. His pain level continues to be 8 out of 10 on average. He continues to try to stretch his shoulder passively daily. Denies numbness and tingling.      Past medical history, surgical history, social history and family history were all reviewed and are per the patient's health history questionnaire form which was filled out by the patient today. I have reviewed and it will be scanned into the patient chart. Family history was discussed and is not pertinent to the presenting problem.      Smoking Status: DAILY  Diabetic: NONE  Thyroid Disorder: NONE  BMI: 21     Allergies:       Allergies   Allergen Reactions    Morphine Itching      Medical History        Past Medical History:   Diagnosis Date    Other specified health status 08/27/2020     No pertinent past medical history            Surgical History         Past Surgical History:   Procedure Laterality Date    CT GUIDED PERCUTANEOUS BIOPSY LUNG   11/10/2022     CT GUIDED PERCUTANEOUS BIOPSY LUNG 11/10/2022 [facility]    OTHER SURGICAL HISTORY   09/27/2022     Bronchoscopy         Social History               Socioeconomic History    Marital status:         Spouse name: Not on file    Number of children: Not on file    Years of education: Not on file    Highest education level: Not on file   Occupational History    Not on file   Tobacco Use    Smoking status: Every Day       Packs/day: .25       Types: Cigarettes    Smokeless tobacco: Never   Vaping Use    Vaping Use: Never used   Substance and Sexual Activity    Alcohol use: Yes    Drug use: Never    Sexual activity: Not on file   Other Topics Concern    Not on file   Social History Narrative    Not on file      Social Determinants of Health      Financial Resource Strain: Not on file   Food Insecurity: Not on file   Transportation Needs: Not on file   Physical Activity: Not on file   Stress: Not on file   Social Connections: Not on file   Intimate Partner Violence: Not on file   Housing Stability: Not on file         Review of Systems:   Review of systems for all 14 systems is negative for complaint except for the above noted       findings in the history of present illness.     Diagnoses/Problems:  Left shoulder pain  Left shoulder rotator cuff pathology     Physical Examination:  Patient is a well-developed, well nourished male in no acute distress. Awake, alert and oriented x3, with appropriate mood. Breathes with normal chest rises. Eye exam reveals round pupils with clear sclera. Gait is steady.     Patient had full range of motion of bilateral elbows. 5 out of 5 bilateral wrist flexion and extension. Thumb extension positive bilaterally. Sensation was intact to light touch to median, ulnar, radial, and axillary nerves bilaterally. Positive radial pulse bilaterally.      Examination of the right shoulder today reveals the skin to be intact. There is no sign any atrophy lesions or abrasions. There is no pain to palpation of the bony prominences. Range of motion of the right shoulder revealed 0-170° of forward elevation. 0-60° of external rotation. Internal rotation was to T12. Provocative maneuvers today were  negative.     Examination of the left shoulder reveals the skin to be intact. No sign any atrophy lesions or abrasions. There is MODERATE tenderness with palpation along the anterior chromium and greater tuberosity.  Exam is limited secondary to pain and guarding a fair amount. Active range of motion is 90° of forward elevation, passively correctable with pain past 90 degrees. 60° of external rotation with anterior palpable pain. Internal rotation was to T12. Abduction to 90° with pain. Acromioclavicular joint is asymptomatic. Bicipital groove POSITIVE. Speeds test POSITIVE. Jobes test POSITIVE. Neers sign POSITIVE. Burnett sign POSITIVE. Provocative maneuvers of the rotator cuff were POSITIVE. External resistance 2/5. Internal resistance 3/5.     Results/Imaging:  Independent review of the left shoulder xrays from 11/14/23 left shoulder shows no signs of any fracture, dislocation or arthritis.    MRI of left shoulder reveals a small full thickness tear. I personally spent time viewing digital images of the radiology testing with the patient. I explained the results to the patient, along with suggested explanation for follow up recommendations based on testing and clinical results.     Patient was prescribed a shoulder sling for postoperative care. The patient will have weakness, instability and/or deformity of their left arm which requires stabilization from this orthosis to improve their function after surgery.     Verbal and written instructions for the use, wear schedule, cleaning and application of this item were given. Patient was instructed that should the brace result in increased pain, decreased sensation, increased swelling, or an overall worsening of their medical condition, to please contact our office immediately at [phone].     Orthotic management and training was provided for skin care, modifications due to healing tissues, edema changes, interruption in skin integrity, and safety precautions  with the orthosis.    *Patient is status post acute shoulder injury. He presents today with continued 8 out of 10 pain and are still in the acute phase of injury. We prescribed a one time Percocet percocet prescription for their acute pain. They are to use it sparingly, alternating with extra strength Tylenol. Patient verbalized an understanding. I have personally obtained and reviewed the OARRS report on this patient. This report is part of the electronic medical record which will be scanned to the chart. I have considered the risks of abuse, dependence, addiction and diversion. I believe that it is clinically appropriate for this patient to be prescribed this medication at this time due to current symptoms and documented diagnosis. Patient is scheduled for left shoulder surgery.     *While intending to generate a timely document that accurately reflects the content of the visit, no guarantee can be provided that every grammatical or spelling mistake has been or will be identified or corrected. Thank you for your understanding.

## 2024-01-18 DIAGNOSIS — M75.122 COMPLETE TEAR OF LEFT ROTATOR CUFF, UNSPECIFIED WHETHER TRAUMATIC: Primary | ICD-10-CM

## 2024-01-19 DIAGNOSIS — Z01.818 PRE-OP EVALUATION: Primary | ICD-10-CM

## 2024-01-30 ENCOUNTER — APPOINTMENT (OUTPATIENT)
Dept: ORTHOPEDIC SURGERY | Facility: HOSPITAL | Age: 59
End: 2024-01-30
Payer: MEDICAID

## 2024-01-31 ENCOUNTER — OFFICE VISIT (OUTPATIENT)
Dept: PULMONOLOGY | Facility: CLINIC | Age: 59
End: 2024-01-31
Payer: MEDICAID

## 2024-01-31 VITALS
RESPIRATION RATE: 16 BRPM | HEIGHT: 65 IN | SYSTOLIC BLOOD PRESSURE: 138 MMHG | OXYGEN SATURATION: 97 % | HEART RATE: 112 BPM | DIASTOLIC BLOOD PRESSURE: 78 MMHG | BODY MASS INDEX: 20.43 KG/M2 | WEIGHT: 122.6 LBS | TEMPERATURE: 98.2 F

## 2024-01-31 DIAGNOSIS — R91.1 LUNG NODULE: Primary | ICD-10-CM

## 2024-01-31 DIAGNOSIS — J98.4 CAVITARY LESION OF LUNG: ICD-10-CM

## 2024-01-31 DIAGNOSIS — R06.09 DYSPNEA ON EXERTION: ICD-10-CM

## 2024-01-31 DIAGNOSIS — R63.4 WEIGHT LOSS: ICD-10-CM

## 2024-01-31 DIAGNOSIS — J43.9 PULMONARY EMPHYSEMA, UNSPECIFIED EMPHYSEMA TYPE (MULTI): ICD-10-CM

## 2024-01-31 PROCEDURE — 1036F TOBACCO NON-USER: CPT | Performed by: INTERNAL MEDICINE

## 2024-01-31 PROCEDURE — 99215 OFFICE O/P EST HI 40 MIN: CPT | Performed by: INTERNAL MEDICINE

## 2024-01-31 RX ORDER — ALBUTEROL SULFATE 90 UG/1
1-2 AEROSOL, METERED RESPIRATORY (INHALATION) EVERY 6 HOURS PRN
Qty: 18 G | Refills: 3 | Status: SHIPPED | OUTPATIENT
Start: 2024-01-31 | End: 2024-04-12 | Stop reason: SDUPTHER

## 2024-01-31 RX ORDER — ALBUTEROL SULFATE 0.83 MG/ML
2.5 SOLUTION RESPIRATORY (INHALATION) 4 TIMES DAILY PRN
Qty: 75 ML | Refills: 3 | Status: SHIPPED | OUTPATIENT
Start: 2024-01-31

## 2024-01-31 ASSESSMENT — ENCOUNTER SYMPTOMS
SHORTNESS OF BREATH: 1
HEADACHES: 0
SINUS PAIN: 0
MYALGIAS: 0
VOMITING: 0
APPETITE CHANGE: 0
DIZZINESS: 0
CONSTIPATION: 0
UNEXPECTED WEIGHT CHANGE: 1
NECK PAIN: 1
FREQUENCY: 0
DYSURIA: 0
FATIGUE: 1
COUGH: 1
CHEST TIGHTNESS: 1
WHEEZING: 1
ARTHRALGIAS: 1
RHINORRHEA: 0
NERVOUS/ANXIOUS: 1
DYSPHORIC MOOD: 0
CHILLS: 0
EYE PAIN: 0
CONFUSION: 1
NAUSEA: 0
DIARRHEA: 0
ABDOMINAL PAIN: 0
PALPITATIONS: 1
SINUS PRESSURE: 0
WOUND: 0
SEIZURES: 0
EYE REDNESS: 0
EYE ITCHING: 0
FEVER: 0
LIGHT-HEADEDNESS: 1
BACK PAIN: 1
SORE THROAT: 0
HEMATURIA: 0

## 2024-01-31 NOTE — PROGRESS NOTES
Subjective   58 YOM with h/o hernia, pulmonary emphysema/blebs s/p resection in 2000, now is being seen for lung nodules.   Patient had not seen a doctor in ten years until he saw Dr. Barboza. Had screening chest CT done in Sep 2020 that showed a ? lung nodules, subsequently had PET done that showed mild activity, for that is referred to my office. Patient subsequently had more work up done as detailed below.   Last seen 3 months ago, no major events since then. Had repeat chest CT done.   Patient had nocturnal pulse oximetry done, qualified for nocturnal O2 therapy. Using it every night, with improvement of his symptoms.   Currently on Symbicort, Spiriva and albuterol. Continues to use his albuterol on several times a day.   Overall is feeling worse than  the last visit.   CC SOB, both at rest and with activity, but mostly activity. LEDEZMA after walking 20 feet without oxygen or climbing 3-4 steps. +ve orthopnea. +ve PND, several times every night. No LE edema. LEDEZMA started 2019, gradual onset, progressive. Associated with chest tightness and wheezing. LEDEZMA worse since the last visit.   +ve wheezing, several times a day. States wheezing improves with albuterol.  Wheezing stable since the last visit.   +ve chronic cough, moderate, mostly in the morning, productive of yellow/green sputum, no h/o hemoptysis. Cough and sputum for years now worse than the last visit.  Having problem falling sleep and staying sleep. +ve snoring, Does not feel rested in am.   Exposures: quit smoking 7/2021, but started again, now quit since 12/2023. Around 40-60 pack/year h/o smoking. 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   Review of Systems   Constitutional:  Positive for fatigue and unexpected weight change. Negative for appetite change, chills and fever.        +ve weight loss.    HENT:  Positive for congestion. Negative for ear pain, postnasal drip, rhinorrhea, sinus pressure, sinus pain and sore throat.    Eyes:  Negative  for pain, redness, itching and visual disturbance.   Respiratory:  Positive for cough, chest tightness, shortness of breath and wheezing.    Cardiovascular:  Positive for chest pain and palpitations. Negative for leg swelling.   Gastrointestinal:  Negative for abdominal pain, constipation, diarrhea, nausea and vomiting.   Genitourinary:  Negative for dysuria, frequency and hematuria.   Musculoskeletal:  Positive for arthralgias, back pain and neck pain. Negative for myalgias.   Skin:  Negative for pallor, rash and wound.   Neurological:  Positive for light-headedness. Negative for dizziness, seizures, syncope and headaches.   Psychiatric/Behavioral:  Positive for confusion. Negative for dysphoric mood. The patient is nervous/anxious.       Current Outpatient Medications   Medication Instructions    acetaminophen (Tylenol) 500 mg tablet 2 tablets, oral, 3 times daily    albuterol 2.5 mg, nebulization, 4 times daily PRN    budesonide-formoteroL (Symbicort) 160-4.5 mcg/actuation inhaler 2 puffs, inhalation, 2 times daily RT, RINSE MOUTH AFTER USE. GO TO EMERGENCY ROOM IF CHEST PAIN OR PALPITATIONS OR FEELING WORSE OR CON    citalopram (CELEXA) 10 mg, oral, Daily    diazePAM (Valium) 5 mg tablet oral, TAKE 1 TABLET 1 HOUR PRIOR TO PROCEDURE.<BR>    diazePAM (VALIUM) 2 mg, oral, Daily PRN    gabapentin (Neurontin) 100 mg capsule oral    gabapentin (Neurontin) 300 mg capsule oral     mg tablet 1 tablet, oral, 3 times daily with meals, prn     mg, oral, 3 times daily PRN    ketorolac (Acular) 0.5 % ophthalmic solution ophthalmic (eye)    methocarbamol (Robaxin) 500 mg tablet 1 tablet, oral, 3 times daily PRN    mirtazapine (REMERON) 15 mg, oral, Nightly    montelukast (SINGULAIR) 10 mg, oral, Nightly, STOP IF MOOD CHANGES.    nicotine polacrilex (Commit) 2 mg lozenge ALLOW 1 LOZENGE TO DISSOLVE SLOWLY IN MOUTH AS DIRECTED    nicotine polacrilex (Nicorette) 2 mg gum CHEW 1 PIECE SLOWLY AND INTERMITTENTLY FOR  30 MINUTES REPEAT EVERY 1 TO 2 HOURS; MAXIMUM OF 24 PIECES A DAY    nitroglycerin (Nitrostat) 0.4 mg SL tablet DISSOLVE ONE TABLET UNDER TONGUE every 5 minutes for up to 3 doses as needed for chest pain. call 911 if pain persists.<BR>    rOPINIRole (REQUIP) 1 mg, oral, 3 times daily    Spiriva with HandiHaler 18 mcg inhalation capsule 1 capsule, inhalation, Daily RT, GO TO ER IF VISUAL/EYE PAIN OR URINARY RETENTION OR PALPITATIONS OR CHEST PAIN OR    Ventolin HFA 90 mcg/actuation inhaler 1-2 puffs, inhalation, Every 6 hours PRN, EVERY 4 TO 6 HOURS AS NEEDED      Objective   Visit Vitals  /78 (BP Location: Right arm, Patient Position: Sitting)   Pulse (!) 112   Temp 36.8 °C (98.2 °F) (Temporal)   Resp 16      Physical Exam  Constitutional:       Appearance: Normal appearance.      Comments: Thin.    HENT:      Head: Normocephalic and atraumatic.      Nose:      Comments: On RA.      Mouth/Throat:      Mouth: Mucous membranes are moist.      Comments: Mallampati 2-3  Eyes:      Extraocular Movements: Extraocular movements intact.      Pupils: Pupils are equal, round, and reactive to light.   Cardiovascular:      Rate and Rhythm: Normal rate and regular rhythm.      Heart sounds: Normal heart sounds. No murmur heard.     No friction rub. No gallop.   Pulmonary:      Effort: Pulmonary effort is normal. No respiratory distress.      Breath sounds: No wheezing or rales.      Comments: Clear lung fields b/l with fair air entry.   Abdominal:      General: There is no distension.      Palpations: Abdomen is soft.      Tenderness: There is no abdominal tenderness. There is no guarding.   Musculoskeletal:         General: No swelling or tenderness. Normal range of motion.      Cervical back: Normal range of motion and neck supple. No rigidity or tenderness.      Right lower leg: No edema.      Left lower leg: No edema.   Lymphadenopathy:      Cervical: No cervical adenopathy.   Skin:     General: Skin is warm and dry.       Coloration: Skin is not jaundiced.   Neurological:      General: No focal deficit present.      Mental Status: He is alert and oriented to person, place, and time.      Cranial Nerves: No cranial nerve deficit.      Motor: No weakness.   Psychiatric:         Mood and Affect: Mood normal.         Behavior: Behavior normal.     Relevant Results  I personally reviewed the images for the chest CT from 2023  that showed:  some progression of wall thickening at probable blebs and bulla at the right upper lung, likely due to infection such as aspergillosis or fungus. A cavitary malignant lesion is not excluded.    The followings were reviewed during previous visits  I personally reviewed the images for the following studies (official reads as below):    CT from 4/10/2023 that showed: interval decrease in RUL cavitary lesion   CT from 3/2023 that showed: interval decrease in the RUL cavitary lesion and subpleural mass like lesion   CT from 2022 that showed worsening of the RUL cavitary lesion   6 MWT from 2022 reviewed, walked 340/477 (55.2%), no desaturation on RA.   I personally interpreted the PFT from 2022 that showed: FEV1/FVC 39%, FEV1 40-->47% (19%) with bronchodilator response, TLC 87%, RV/%, DLCO 56-->70%.   Stress test in 10/2022 negative.  Sleep study result from 2022 reviewed that showed RDI 3% 18.4%, RDI 4% 9.4%, emelia O2 80s.  Assessment/Plan   58 YOM with h/o bullectomy, smoking, now is being seen for LEDEZMA and lung mass/nodule.      1. Lung mass/nodule: ? scar. Bullectomy both lungs in . 2.9 x 1.7 cm right apex mass/speculated and an 8 mm RUL nodule, both only mild uptake on PET. Patient is high risk given h/o smoking and recent h/o weight loss. Also father  of lung cancer. CT chest done, that showed stable nodule and likely scar tissue. Repeat chest CT in 2021 showed enlarging RUL nodule. PET increased uptake, s/p bronch + biopsy that showed chronic inflammation with  occasional not necrotizing granuloma. Patient had repeat chest CT done in Sep 2022 that showed increased in size of the cavity. Subsequently was referred to thoracic surgery who ordered a CT guided biopsy which was done in 11/2022. Cultures +ve for pseudomonas, but otherwise negative. Cytology negative. Pathology showed dense fibroadipose tissue with chronic inflammation. Was treated with Levaquin for 2 weeks. Repeat CTs in March and April 2023 showed continuous improvement. However CT in 11/2023 showed worsening RUL thickening of bullae concerning for infection and malignancy. Especially since he has lost weight and has worsening symptoms.   PET/CT of the chest   Sputum for AFB, fungal and bacterial cultures     2. COPD: PFT showed moderate COPD. Significant air trapping, marked LEDEZMA, on LAMA + LABA + ICS and rescue inhaler. Rescue inhaler need is now very frequent. Symptoms stable since the last visit. CAT today 34. Category B. Started on Anoro previously, but could not afford due to high Co-payment. Now back on Symbicort + Spiriva.        Continue Symbicort, Spiriva and Singulair   continue Ventolin as needed.    6 MWT done, no desaturation   nocturnal pulse O2 done, qualified for nocturnal oxygen, advised to continue to wear   pulmonary rehab referral done, has not attended yet.      3. Smoking: quit smoking July 2021, but now started again. Tried nicotine patch without any effect. Quit again 12/2023   started on nicotine gums, will continue      4. LEDEZMA: likely from COPD, had cardiac work up done.    2D echocardiogram done, only showed dilated LA   stress test in 10/2022 also negative.      5. Weight loss: ? cause, pulmonary work up negative for cancer.    advised to discuss this with his primary care. Has not talked to them yet.      RTC in 1 months with the result of the sputum cultures and PET/CT.      Sully Carlin MD    The following addendum is made as per ortho request for pre-op risk  assessment:    Based on the history of COPD, nocturnal hypoxia and recent smoking history he is a high risk patient going for a high risk surgery (orthopedic surgery). This overall places him at a high risk of developing niko-op pulmonary complications. However there is no absolute contraindications. Would recommends:  Early ambulation  Continue inhalers during the niko-op period  DVT prophylaxis  Bronchopulmonary toileting with Acapella, incentive spirometer.

## 2024-01-31 NOTE — PATIENT INSTRUCTIONS
Mr. Crawley,     It was a pleasure to talk to you today. We discussed the followings:     1. Lung mass/nodule: given it has minimal activity on PET scan and it was stable in size in your repeat chest CT for a while. However your repeat chest CT in 9/2022 showed enlarging nodule. You subsequently had a biopsy done that did not show any cancer. Given it looks worse on your repeat CT, I have ordered new PET scan and also sputum culture.    2. COPD: your breathing test showed that you have moderate COPD. Given your symptoms are not well controlled, please make sure you will attend the pulmonary rehab. Your six minute walk test result showed that you did not qualify for oxygen supplementation. Please continue Symbicort, Spiriva, Montelukast and Ventolin as needed.      3. Smoking: congratulate on quitting.      4. Chest pain: please make sure you are following up with a heart doctor.     5. Low oxygen level: please continue to wear your supplemental oxygen at nights.      6. Weight loss: discuss this with your primary care.     Please return to the clinic in 1-2 months with the result of the above tests.

## 2024-02-01 DIAGNOSIS — Z01.818 PRE-OP EVALUATION: Primary | ICD-10-CM

## 2024-02-12 ENCOUNTER — TELEMEDICINE CLINICAL SUPPORT (OUTPATIENT)
Dept: PREADMISSION TESTING | Facility: HOSPITAL | Age: 59
End: 2024-02-12
Payer: MEDICAID

## 2024-02-12 DIAGNOSIS — M75.122 COMPLETE TEAR OF LEFT ROTATOR CUFF, UNSPECIFIED WHETHER TRAUMATIC: ICD-10-CM

## 2024-02-14 ENCOUNTER — OFFICE VISIT (OUTPATIENT)
Dept: ORTHOPEDIC SURGERY | Facility: CLINIC | Age: 59
End: 2024-02-14
Payer: MEDICAID

## 2024-02-14 ENCOUNTER — PREP FOR PROCEDURE (OUTPATIENT)
Dept: ORTHOPEDIC SURGERY | Facility: HOSPITAL | Age: 59
End: 2024-02-14

## 2024-02-14 ENCOUNTER — APPOINTMENT (OUTPATIENT)
Dept: RADIOLOGY | Facility: CLINIC | Age: 59
End: 2024-02-14
Payer: MEDICAID

## 2024-02-14 DIAGNOSIS — M75.122 COMPLETE TEAR OF LEFT ROTATOR CUFF, UNSPECIFIED WHETHER TRAUMATIC: Primary | ICD-10-CM

## 2024-02-14 DIAGNOSIS — M75.102 TEAR OF LEFT ROTATOR CUFF, UNSPECIFIED TEAR EXTENT, UNSPECIFIED WHETHER TRAUMATIC: Primary | ICD-10-CM

## 2024-02-14 PROCEDURE — 99214 OFFICE O/P EST MOD 30 MIN: CPT | Performed by: STUDENT IN AN ORGANIZED HEALTH CARE EDUCATION/TRAINING PROGRAM

## 2024-02-14 NOTE — PROGRESS NOTES
CHIEF COMPLAINT: No chief complaint on file.    History: 58 y.o. male presents to the office today for evaluation of his left shoulder.  The patient is right-hand dominant.  He has been having left shoulder pain for many years now.  He has been having multiple cortisone injections over these last few years, but is gotten to the point that they are not helping anymore.  He has tried extensive activity modification and physician directed exercises without relief.  Continues to have significant pain in the left shoulder is primarily anterior lateral.  Difficulty with overhead activities.  He was actually scheduled to have surgery with one of my partners, but due to scheduling issues, he is referred to see me.  Of note, he does have a history of COPD and is an active smoker, and smokes about half pack a day.    Past medical history, past surgical history, medications, allergies, family history, social history, and review of systems were reviewed today.    A 12 point review of systems was negative other than as stated in the HPI.    Past Medical History:   Diagnosis Date    Bleb, lung (CMS/[facility])     s/p resection in 2000    Cigarette smoker     COPD (chronic obstructive pulmonary disease) (CMS/[facility])     Emphysema lung (CMS/[facility])     H/O alcohol abuse     Lung nodule     Nocturnal hypoxemia     Wears O2 QHS    Other specified health status 08/27/2020    No pertinent past medical history    Requires continuous at home supplemental oxygen     2.5 L QHS    Sleep-disordered breathing         Allergies   Allergen Reactions    Morphine Itching        Past Surgical History:   Procedure Laterality Date    BRONCHOSCOPY  09/2022    CARDIOVASCULAR STRESS TEST  10/14/2022    IMPRESSION: 1. There is no evidence of ischemia or prior infarction. 2. The left ventricle is normal in size. 3. Normal LV wall motion with stress LV EF estimated at 52%.    CT GUIDED PERCUTANEOUS BIOPSY LUNG  11/10/2022    CT GUIDED PERCUTANEOUS BIOPSY LUNG  11/10/2022 [facility]    ECHOCARDIOGRAM 2 D M MODE PANEL  05/27/2021    CONCLUSIONS:  1. The left ventricular systolic function is normal with a 55-60% estimated ejection fraction.  2. The left atrium is moderate to severely dilated.    HERNIA REPAIR      KNEE SURGERY Right 1994    LUNG REMOVAL, PARTIAL  2000    blebs s/p resection        Family History   Problem Relation Name Age of Onset    Lung cancer Father      No Known Problems Sister      No Known Problems Brother          Social History     Socioeconomic History    Marital status:      Spouse name: Not on file    Number of children: Not on file    Years of education: Not on file    Highest education level: Not on file   Occupational History    Not on file   Tobacco Use    Smoking status: Every Day     Packs/day: 1.00     Years: 40.00     Additional pack years: 0.00     Total pack years: 40.00     Types: Cigarettes    Smokeless tobacco: Never    Tobacco comments:     10 cigs/day trying to quit    Vaping Use    Vaping Use: Never used   Substance and Sexual Activity    Alcohol use: Yes     Alcohol/week: 4.0 standard drinks of alcohol     Types: 4 Cans of beer per week    Drug use: Never    Sexual activity: Defer   Other Topics Concern    Not on file   Social History Narrative    Not on file     Social Determinants of Health     Financial Resource Strain: Not on file   Food Insecurity: Not on file   Transportation Needs: Not on file   Physical Activity: Not on file   Stress: Not on file   Social Connections: Not on file   Intimate Partner Violence: Not on file   Housing Stability: Not on file        CURRENT MEDICATIONS:   Current Outpatient Medications   Medication Sig Dispense Refill    acetaminophen (Tylenol) 500 mg tablet Take 2 tablets (1,000 mg) by mouth 3 times a day.      albuterol 2.5 mg /3 mL (0.083 %) nebulizer solution Take 3 mL (2.5 mg) by nebulization 4 times a day as needed for wheezing or shortness of breath. 75 mL 3     budesonide-formoteroL (Symbicort) 160-4.5 mcg/actuation inhaler Inhale 2 puffs 2 times a day. RINSE MOUTH AFTER USE. GO TO EMERGENCY ROOM IF CHEST PAIN OR PALPITATIONS OR FEELING WORSE OR CON 30 each 3    citalopram (CeleXA) 10 mg tablet Take 1 tablet (10 mg) by mouth once daily.      diazePAM (Valium) 2 mg tablet Take 1 tablet (2 mg) by mouth once daily as needed.      diazePAM (Valium) 5 mg tablet Take by mouth. TAKE 1 TABLET 1 HOUR PRIOR TO PROCEDURE.      gabapentin (Neurontin) 100 mg capsule Take by mouth.      gabapentin (Neurontin) 300 mg capsule Take by mouth.       mg tablet Take 1 tablet (800 mg) by mouth 3 times a day with meals. prn      ketorolac (Acular) 0.5 % ophthalmic solution Administer into affected eye(s).      methocarbamol (Robaxin) 500 mg tablet Take 1 tablet (500 mg) by mouth 3 times a day as needed (pain).      mirtazapine (Remeron) 15 mg tablet Take 1 tablet (15 mg) by mouth once daily at bedtime.      montelukast (Singulair) 10 mg tablet Take 1 tablet (10 mg) by mouth once daily at bedtime. STOP IF MOOD CHANGES. (Patient not taking: Reported on 12/19/2023) 30 tablet 1    nicotine polacrilex (Commit) 2 mg lozenge ALLOW 1 LOZENGE TO DISSOLVE SLOWLY IN MOUTH AS DIRECTED      nicotine polacrilex (Nicorette) 2 mg gum CHEW 1 PIECE SLOWLY AND INTERMITTENTLY FOR 30 MINUTES REPEAT EVERY 1 TO 2 HOURS; MAXIMUM OF 24 PIECES A DAY      nitroglycerin (Nitrostat) 0.4 mg SL tablet DISSOLVE ONE TABLET UNDER TONGUE every 5 minutes for up to 3 doses as needed for chest pain. call 911 if pain persists.      rOPINIRole (Requip) 1 mg tablet Take 1 tablet (1 mg) by mouth 3 times a day.      Spiriva with HandiHaler 18 mcg inhalation capsule Place 1 capsule (18 mcg) into inhaler and inhale once daily. GO TO ER IF VISUAL/EYE PAIN OR URINARY RETENTION OR PALPITATIONS OR CHEST PAIN OR      Ventolin HFA 90 mcg/actuation inhaler Inhale 1-2 puffs every 6 hours if needed for wheezing or shortness of breath. EVERY  "4 TO 6 HOURS AS NEEDED 18 g 3     No current facility-administered medications for this visit.       Physical Examination:      2/24/2023    11:05 AM 6/12/2023     1:25 PM 10/27/2023     9:47 AM 12/19/2023    10:21 AM 1/3/2024     2:03 PM 1/9/2024     2:04 PM 1/31/2024    10:39 AM   Vitals   Systolic 140 116 148    138   Diastolic 80 70 89    78   Heart Rate 88 89 77    112   Temp 36.8 °C (98.3 °F) 36.8 °C (98.2 °F) 36.4 °C (97.5 °F)    36.8 °C (98.2 °F)   Resp 18 18     16   Height (in) 1.676 m (5' 6\") 1.676 m (5' 6\") 1.651 m (5' 5\") 1.651 m (5' 5\") 1.651 m (5' 5\") 1.651 m (5' 5\") 1.651 m (5' 5\")   Weight (lb) 127 127 128.2 130 130 130 122.6   BMI 20.5 kg/m2 20.5 kg/m2 21.33 kg/m2 21.63 kg/m2 21.63 kg/m2 21.63 kg/m2 20.4 kg/m2   BSA (m2) 1.64 m2 1.64 m2 1.63 m2 1.64 m2 1.64 m2 1.64 m2 1.6 m2   Visit Report   Report Report  Report Report      There is no height or weight on file to calculate BMI.    Well-appearing, appears stated age, pleasant and cooperative, appropriate mood and behavior. Height and weight reviewed. Alert and oriented x3.  Auditory function intact.  No acute distress.  Intact ocular function, ALBERT, EOMI. Breathing is unlabored .  There is no evidence of jugular venous distension. Skin appearance is normal without evidence of rash or other lesions. 2+ radial pulses bilaterally, fingers pink and wwp, good capillary refill, no pitting edema. No appreciable lymphadenopathy in bilateral upper extremities. SILT throughout both upper extremities, median/radial/ulnar/musculocutaneous/axillary nerve motor and sensory intact (except for abnormalities noted in focused musculoskeletal exam section below).     Neck exam: Full range of motion of the neck in flexion/extension and rotational movements. No significant areas of tenderness to palpation in the neck.    On exam of bilateral upper extremities, very limited range of motion of the left shoulder.  Active forward flexion 90, external rotation to neutral, " internal rotation to the side.  Passively I can achieve much more range of motion but is very painful for the patient.  On the right side is active forward flexion 160, external rotation 30, internal rotation to T12.  Rotator cuff strength is decreased on the left, 4 out of 5 strength resisted supraspinatus testing, preserved strength with resisted external rotation testing.  Significant pain with Neer Burnett maneuvers of the left shoulder.    Imaging: Radiographs and MRI of the left shoulder reviewed today.  Personally interpreted by myself.  There is a small, full-thickness tear of the anterior supraspinatus tendon.    Assessment: Small full-thickness rotator cuff tear    Plan: Extensive discussion with the patient with treatment options diagnosis.  At this point he has failed multiple cortisone injections, activity modification, physician directed exercises, over-the-counter medication.  Imaging did reveal a full-thickness rotator cuff tear, though it is small.  At this point the patient feels he cannot live with the shoulder as it is.  We discussed the role of an arthroscopic rotator cuff repair.  The patient like to proceed with this.  Of note, the patient does have a history of COPD and is an active smoker.  I told him that he needs to stop smoking in order to increase the chance that the tendon will heal, as well as to decrease the chance of perioperative complications.  The patient says that he has already planned on quitting smoking.     The patient has failed conservative management, including therapy and injections.  At this point, the patient is a candidate for surgery.  Patient is in agreement with this.  Risks and recovery after surgery were discussed.  The proposed procedure will be an arthroscopic rotator cuff repair, extensive debridement, and possible biceps tenodesis depending on the state of the biceps.  Risks of surgery include bleeding, infection, failure of the tendon to heal, neurovascular  injury, persistent pain, failure of the repair, and possible need for further surgery.  We discussed that there are certain risk factors for the rotator cuff tendon not healing to bone, including age over 65, large and massive rotator cuff tears, smoking, as well as others. All surgeries that are performed under anesthesia also have the risks of DVTs, pulmonary embolism, potentially death. Per anesthesia's evaluation, the patient will have a nerve block, the risks of which the anesthesia team will discuss with the patient.   Patient voiced understanding of these risks and wished to proceed.  Postoperative recovery involves being in a sling for 6 weeks without weightbearing, and then gradual therapy to strengthen the arm.  We did discuss that rotator cuff surgery has an extensive recovery, usually around 5 to 6 months until they are getting their strength back and probably feeling around 85% at that time. It is really 1 year until their arm is feeling completely better and at 100%.    The patient will need PATs which will also include a CBC, BMP, PT/INR and a full work up by the PAT team as well as anesthesia evaluation.  My office will work to get this scheduled. I will see them back 2 weeks after surgery.    Patient was prescribed a shoulder sling for postoperative care. The patient will have weakness, instability and/or deformity of their (right/left) arm which requires stabilization from this orthosis to improve their function after surgery. Verbal and written instructions for the use, wear schedule, cleaning and application of this item were given. Patient was instructed that should the brace result in increased pain, decreased sensation, increased swelling, or an overall worsening of their medical condition, to please contact our office immediately. Orthotic management and training was provided for skin care, modifications due to healing tissues, edema changes, interruption in skin integrity, and safety  precautions with the orthosis.      Dragon software was used to dictate this note, please be aware that minor errors in transcription may be present.    Nehemias Leach MD    Shoulder/Elbow Surgery  [facility]

## 2024-02-15 ENCOUNTER — APPOINTMENT (OUTPATIENT)
Dept: PREADMISSION TESTING | Facility: HOSPITAL | Age: 59
End: 2024-02-15
Payer: MEDICAID

## 2024-02-29 ENCOUNTER — LAB (OUTPATIENT)
Dept: LAB | Facility: LAB | Age: 59
End: 2024-02-29
Payer: MEDICAID

## 2024-02-29 ENCOUNTER — HOSPITAL ENCOUNTER (OUTPATIENT)
Dept: CARDIOLOGY | Facility: HOSPITAL | Age: 59
Discharge: HOME | End: 2024-02-29
Payer: MEDICAID

## 2024-02-29 ENCOUNTER — PRE-ADMISSION TESTING (OUTPATIENT)
Dept: PREADMISSION TESTING | Facility: HOSPITAL | Age: 59
End: 2024-02-29
Payer: MEDICAID

## 2024-02-29 VITALS
TEMPERATURE: 97 F | RESPIRATION RATE: 16 BRPM | WEIGHT: 120.37 LBS | DIASTOLIC BLOOD PRESSURE: 80 MMHG | HEIGHT: 66 IN | BODY MASS INDEX: 19.35 KG/M2 | HEART RATE: 75 BPM | SYSTOLIC BLOOD PRESSURE: 141 MMHG | OXYGEN SATURATION: 100 %

## 2024-02-29 DIAGNOSIS — F17.219 CIGARETTE NICOTINE DEPENDENCE WITH NICOTINE-INDUCED DISORDER: ICD-10-CM

## 2024-02-29 DIAGNOSIS — M75.102 TEAR OF LEFT ROTATOR CUFF, UNSPECIFIED TEAR EXTENT, UNSPECIFIED WHETHER TRAUMATIC: ICD-10-CM

## 2024-02-29 DIAGNOSIS — Z01.818 PREPROCEDURAL EXAMINATION: Primary | ICD-10-CM

## 2024-02-29 DIAGNOSIS — Z01.818 PREPROCEDURAL EXAMINATION: ICD-10-CM

## 2024-02-29 LAB
ALBUMIN SERPL BCP-MCNC: 4 G/DL (ref 3.4–5)
ALP SERPL-CCNC: 116 U/L (ref 33–120)
ALT SERPL W P-5'-P-CCNC: 13 U/L (ref 10–52)
ANION GAP SERPL CALC-SCNC: 10 MMOL/L (ref 10–20)
AST SERPL W P-5'-P-CCNC: 21 U/L (ref 9–39)
BASOPHILS # BLD AUTO: 0.05 X10*3/UL (ref 0–0.1)
BASOPHILS NFR BLD AUTO: 0.5 %
BILIRUB SERPL-MCNC: 0.3 MG/DL (ref 0–1.2)
BUN SERPL-MCNC: 12 MG/DL (ref 6–23)
CALCIUM SERPL-MCNC: 10.1 MG/DL (ref 8.6–10.3)
CHLORIDE SERPL-SCNC: 100 MMOL/L (ref 98–107)
CO2 SERPL-SCNC: 30 MMOL/L (ref 21–32)
CREAT SERPL-MCNC: 0.74 MG/DL (ref 0.5–1.3)
EGFRCR SERPLBLD CKD-EPI 2021: >90 ML/MIN/1.73M*2
EOSINOPHIL # BLD AUTO: 0.06 X10*3/UL (ref 0–0.7)
EOSINOPHIL NFR BLD AUTO: 0.6 %
ERYTHROCYTE [DISTWIDTH] IN BLOOD BY AUTOMATED COUNT: 15.2 % (ref 11.5–14.5)
GLUCOSE SERPL-MCNC: 109 MG/DL (ref 74–99)
HCT VFR BLD AUTO: 46 % (ref 41–52)
HGB BLD-MCNC: 14.9 G/DL (ref 13.5–17.5)
IMM GRANULOCYTES # BLD AUTO: 0.03 X10*3/UL (ref 0–0.7)
IMM GRANULOCYTES NFR BLD AUTO: 0.3 % (ref 0–0.9)
LYMPHOCYTES # BLD AUTO: 1.52 X10*3/UL (ref 1.2–4.8)
LYMPHOCYTES NFR BLD AUTO: 15.4 %
MCH RBC QN AUTO: 30.8 PG (ref 26–34)
MCHC RBC AUTO-ENTMCNC: 32.4 G/DL (ref 32–36)
MCV RBC AUTO: 95 FL (ref 80–100)
MONOCYTES # BLD AUTO: 0.89 X10*3/UL (ref 0.1–1)
MONOCYTES NFR BLD AUTO: 9 %
NEUTROPHILS # BLD AUTO: 7.31 X10*3/UL (ref 1.2–7.7)
NEUTROPHILS NFR BLD AUTO: 74.2 %
NRBC BLD-RTO: 0 /100 WBCS (ref 0–0)
PLATELET # BLD AUTO: 398 X10*3/UL (ref 150–450)
POTASSIUM SERPL-SCNC: 5.4 MMOL/L (ref 3.5–5.3)
PROT SERPL-MCNC: 7.3 G/DL (ref 6.4–8.2)
RBC # BLD AUTO: 4.84 X10*6/UL (ref 4.5–5.9)
SODIUM SERPL-SCNC: 135 MMOL/L (ref 136–145)
WBC # BLD AUTO: 9.9 X10*3/UL (ref 4.4–11.3)

## 2024-02-29 PROCEDURE — 87081 CULTURE SCREEN ONLY: CPT | Mod: AHULAB | Performed by: NURSE PRACTITIONER

## 2024-02-29 PROCEDURE — 85025 COMPLETE CBC W/AUTO DIFF WBC: CPT

## 2024-02-29 PROCEDURE — 36415 COLL VENOUS BLD VENIPUNCTURE: CPT

## 2024-02-29 PROCEDURE — 99203 OFFICE O/P NEW LOW 30 MIN: CPT | Performed by: NURSE PRACTITIONER

## 2024-02-29 PROCEDURE — 93005 ELECTROCARDIOGRAM TRACING: CPT

## 2024-02-29 PROCEDURE — 80053 COMPREHEN METABOLIC PANEL: CPT

## 2024-02-29 RX ORDER — CHLORHEXIDINE GLUCONATE ORAL RINSE 1.2 MG/ML
15 SOLUTION DENTAL DAILY
Qty: 30 ML | Refills: 0 | Status: SHIPPED | OUTPATIENT
Start: 2024-02-29 | End: 2024-03-02

## 2024-02-29 ASSESSMENT — ENCOUNTER SYMPTOMS
NECK NEGATIVE: 1
CONSTITUTIONAL NEGATIVE: 1
CARDIOVASCULAR NEGATIVE: 1
ARTHRALGIAS: 1
WHEEZING: 1
NEUROLOGICAL NEGATIVE: 1
GASTROINTESTINAL NEGATIVE: 1

## 2024-02-29 NOTE — PREPROCEDURE INSTRUCTIONS
Medication List            Accurate as of February 29, 2024  2:00 PM. Always use your most recent med list.                acetaminophen 500 mg tablet  Commonly known as: Tylenol  Notes to patient: Use if needed      budesonide-formoteroL 160-4.5 mcg/actuation inhaler  Commonly known as: Symbicort  Inhale 2 puffs 2 times a day. RINSE MOUTH AFTER USE. GO TO EMERGENCY ROOM IF CHEST PAIN OR PALPITATIONS OR FEELING WORSE OR CON  Medication Adjustments for Surgery: Take morning of surgery with sip of water, no other fluids     chlorhexidine 0.12 % solution  Commonly known as: Peridex  Use 15 mL in the mouth or throat once daily for 2 days.  Notes to patient: Use as directed      diazePAM 2 mg tablet  Commonly known as: Valium  Medication Adjustments for Surgery: Take morning of surgery with sip of water, no other fluids      mg tablet  Generic drug: ibuprofen  Medication Adjustments for Surgery: Stop 7 days before surgery     montelukast 10 mg tablet  Commonly known as: Singulair  Take 1 tablet (10 mg) by mouth once daily at bedtime. STOP IF MOOD CHANGES.  Medication Adjustments for Surgery: Take morning of surgery with sip of water, no other fluids     nitroglycerin 0.4 mg SL tablet  Commonly known as: Nitrostat  Notes to patient: Use if needed      Spiriva with HandiHaler 18 mcg inhalation capsule  Generic drug: tiotropium  Medication Adjustments for Surgery: Take morning of surgery with sip of water, no other fluids     * Ventolin HFA 90 mcg/actuation inhaler  Generic drug: albuterol  Inhale 1-2 puffs every 6 hours if needed for wheezing or shortness of breath. EVERY 4 TO 6 HOURS AS NEEDED  Medication Adjustments for Surgery: Take morning of surgery with sip of water, no other fluids     * albuterol 2.5 mg /3 mL (0.083 %) nebulizer solution  Take 3 mL (2.5 mg) by nebulization 4 times a day as needed for wheezing or shortness of breath.  Notes to patient: Use if needed            * This list has 2  medication(s) that are the same as other medications prescribed for you. Read the directions carefully, and ask your doctor or other care provider to review them with you.                     CONTACT SURGEON'S OFFICE IF YOU DEVELOP:  * Fever = 100.4 F   * New respiratory symptoms (e.g. cough, shortness of breath, respiratory distress, sore throat)  * Recent loss of taste or smell  *Flu like symptoms such as headache, fatigue or gastrointestinal symptoms  * You develop any open sores, shingles, burning or painful urination   AND/OR:  * You no longer wish to have the surgery.  * Any other personal circumstances change that may lead to the need to cancel or defer this surgery.  *You were admitted to any hospital within one week of your planned procedure.    SMOKING:  *Quitting smoking can make a huge difference to your health and recovery from surgery.    *If you need help with quitting, call 7-877-QUIT-NOW.    THE DAY BEFORE SURGERY:  *Do not eat any food after midnight the night before surgery.   *You are permitted to drink clear liquids (i.e. water, black coffee, tea, clear broth, apple juice) up to 2 hours before your surgery.  DIABETICS:  Please check fasting blood sugar  upon waking up.  If fasting sugar is <80 mg/dl, please drink 100ml/3oz of apple juice no later than 2 hours prior to surgery.      SURGICAL TIME  *You will be contacted between 2 p.m. and 6 p.m. the business day before your surgery with your arrival time.  *If you haven't received a call by 6pm, call [phone].  *Scheduled surgery times may change and you will be notified if this occurs-check your personal voicemail for any updates.    ON THE MORNING OF SURGERY:  *Wear comfortable, loose fitting clothing.   *Do not use moisturizers, creams, lotions or perfume.  *All jewelry and valuables should be left at home.  *Prosthetic devices such as contact lenses, hearing aids, dentures, eyelash extensions, hairpins and body piercing must be removed  before surgery.    BRING WITH YOU:  *Photo ID and insurance card  *Current list of medicines and allergies  *Pacemaker/Defibrillator/Heart stent cards  *CPAP machine and mask  *Slings/splints/crutches  *Copy of your complete Advanced Directive/DHPOA-if applicable  *Neurostimulator implant remote    PARKING AND ARRIVAL:  *Check in at the Main Entrance desk and let them know you are here for surgery.  *You will be directed to the 2nd floor surgical waiting area.    AFTER OUTPATIENT SURGERY:  *A responsible adult MUST accompany you at the time of discharge and stay with you for 24 hours after your surgery.  *You may NOT drive yourself home after surgery.  *You may use a taxi or ride sharing service ([employer], Uber) to return home ONLY if you are accompanied by a friend or family member.  *Instructions for resuming your medications will be provided by your surgeon.  YOU HAVE REVIEWED THE MEDICATIONS ON THIS SHEET AND YOU VERIFY THESE ARE ALL THE MEDICATIONS AND OVER THE COUNTER MEDICATIONS THAT YOU TAKE .

## 2024-02-29 NOTE — CPM/PAT H&P
CPM/PAT Evaluation       Name: Alber Reese (Alber Reese)  /Age: 1965/58 y.o.       SURGEON :DR HARESH ISRAEL     Surgery, Date, and Length:  Left Shoulder Arthroscopy;  Rotator Cuff Repair , 3/11/24    HPI:  This a 58 y.o. male who presents for presurgical evaluation for  for above mentioned procedure.Pt with Full Thickness Rotator cuff tear. Difficulty with over head activities.  . After discussion of the risks and benefits with Dr. ISRAEL the patient elects to proceed with the planned procedure.   Left Shoulder Arthroscopy;  Rotator Cuff Repair     Past Medical History:   Diagnosis Date    Bleb, lung (CMS/[facility])     s/p resection in     Cigarette smoker     COPD (chronic obstructive pulmonary disease) (CMS/[facility])     Emphysema lung (CMS/[facility])     H/O alcohol abuse     Lung nodule     Nocturnal hypoxemia     Wears O2 QHS    Other specified health status 2020    No pertinent past medical history    Requires continuous at home supplemental oxygen     2.5 L QHS    Sleep-disordered breathing        Past Surgical History:   Procedure Laterality Date    BRONCHOSCOPY  2022    CARDIOVASCULAR STRESS TEST  10/14/2022    IMPRESSION: 1. There is no evidence of ischemia or prior infarction. 2. The left ventricle is normal in size. 3. Normal LV wall motion with stress LV EF estimated at 52%.    CT GUIDED PERCUTANEOUS BIOPSY LUNG  11/10/2022    CT GUIDED PERCUTANEOUS BIOPSY LUNG 11/10/2022 [facility]    ECHOCARDIOGRAM 2 D M MODE PANEL  2021    CONCLUSIONS:  1. The left ventricular systolic function is normal with a 55-60% estimated ejection fraction.  2. The left atrium is moderate to severely dilated.    HERNIA REPAIR      KNEE SURGERY Right     LUNG REMOVAL, PARTIAL      blebs s/p resection     Anesthesia History  Pt denies any past history of anesthetic complications such as PONV, awareness, prolonged sedation, dental damage, aspiration, cardiac arrest, difficult intubation, difficult I.V. access  or unexpected hospital admissions.  NO malignant hyperthermia and or pseudo cholinesterase deficiency.    The patient is not  a Scientologist and will accept blood and blood products if medically indicated.   No history of blood transfusions .Type and screen not sent.     Social History  Social History     Substance and Sexual Activity   Drug Use Never      Social History     Substance and Sexual Activity   Alcohol Use Yes    Alcohol/week: 6.0 standard drinks of alcohol    Types: 6 Cans of beer per week      Social History     Tobacco Use   Smoking Status Every Day    Packs/day: 1.00    Years: 40.00    Additional pack years: 0.00    Total pack years: 40.00    Types: Cigarettes   Smokeless Tobacco Never   Tobacco Comments    10 cigs/day trying to quit           Family History   Problem Relation Name Age of Onset    Lung cancer Father      No Known Problems Sister      No Known Problems Brother         Allergies   Allergen Reactions    Morphine Itching       Prior to Admission medications    Medication Sig Start Date End Date Taking? Authorizing Provider   acetaminophen (Tylenol) 500 mg tablet Take 2 tablets (1,000 mg) by mouth 3 times a day. 2/16/22   Historical Provider, MD   albuterol 2.5 mg /3 mL (0.083 %) nebulizer solution Take 3 mL (2.5 mg) by nebulization 4 times a day as needed for wheezing or shortness of breath. 1/31/24   Sully Carlin MD   budesonide-formoteroL (Symbicort) 160-4.5 mcg/actuation inhaler Inhale 2 puffs 2 times a day. RINSE MOUTH AFTER USE. GO TO EMERGENCY ROOM IF CHEST PAIN OR PALPITATIONS OR FEELING WORSE OR CON 10/27/23 11/26/23  Sully Carlin MD   chlorhexidine (Peridex) 0.12 % solution Use 15 mL in the mouth or throat once daily for 2 days. 2/29/24 3/2/24  Patricia Crowder, APRN-CNP   citalopram (CeleXA) 10 mg tablet Take 1 tablet (10 mg) by mouth once daily. 5/26/23   Historical Provider, MD   diazePAM (Valium) 2 mg tablet Take 1 tablet (2 mg) by mouth once daily as  needed. 10/24/23   Historical Provider, MD   diazePAM (Valium) 5 mg tablet Take by mouth. TAKE 1 TABLET 1 HOUR PRIOR TO PROCEDURE. 9/15/22   Historical Provider, MD   gabapentin (Neurontin) 100 mg capsule Take by mouth. 3/9/22   Historical Provider, MD   gabapentin (Neurontin) 300 mg capsule Take by mouth. 4/12/22   Historical Provider, MD    mg tablet Take 1 tablet (800 mg) by mouth 3 times a day with meals. prn 10/24/23   Historical Provider, MD   ketorolac (Acular) 0.5 % ophthalmic solution Administer into affected eye(s). 5/5/21   Historical Provider, MD   methocarbamol (Robaxin) 500 mg tablet Take 1 tablet (500 mg) by mouth 3 times a day as needed (pain). 4/12/22   Historical Provider, MD   mirtazapine (Remeron) 15 mg tablet Take 1 tablet (15 mg) by mouth once daily at bedtime. 10/24/23   Historical Provider, MD   montelukast (Singulair) 10 mg tablet Take 1 tablet (10 mg) by mouth once daily at bedtime. STOP IF MOOD CHANGES.  Patient not taking: Reported on 12/19/2023 10/27/23 12/26/23  Sully Carlin MD   nicotine polacrilex (Commit) 2 mg lozenge ALLOW 1 LOZENGE TO DISSOLVE SLOWLY IN MOUTH AS DIRECTED    Historical Provider, MD   nicotine polacrilex (Nicorette) 2 mg gum CHEW 1 PIECE SLOWLY AND INTERMITTENTLY FOR 30 MINUTES REPEAT EVERY 1 TO 2 HOURS; MAXIMUM OF 24 PIECES A DAY 6/13/23   Historical Provider, MD   nitroglycerin (Nitrostat) 0.4 mg SL tablet DISSOLVE ONE TABLET UNDER TONGUE every 5 minutes for up to 3 doses as needed for chest pain. call 911 if pain persists.    Historical Provider, MD   rOPINIRole (Requip) 1 mg tablet Take 1 tablet (1 mg) by mouth 3 times a day. 5/27/23   Historical Provider, MD   Spiriva with HandiHaler 18 mcg inhalation capsule Place 1 capsule (18 mcg) into inhaler and inhale once daily. GO TO ER IF VISUAL/EYE PAIN OR URINARY RETENTION OR PALPITATIONS OR CHEST PAIN OR    Historical Provider, MD   Ventolin HFA 90 mcg/actuation inhaler Inhale 1-2 puffs every 6 hours if  "needed for wheezing or shortness of breath. EVERY 4 TO 6 HOURS AS NEEDED 1/31/24   Sully Carlin MD        PAT ROS:   Constitutional:   neg    Neuro/Psych:   neg    Eyes:   Ears:   Nose:   Mouth:   neg    Throat:   neg    Neck:   neg    Cardio:   neg    Respiratory:    wheezing  Endocrine:   GI:   neg    :   neg    Musculoskeletal:    arthralgias  Hematologic:   neg    Skin:  neg        Physical Exam  Vitals reviewed.   Constitutional:       Appearance: He is ill-appearing.   HENT:      Head: Normocephalic.      Mouth/Throat:      Mouth: Mucous membranes are dry.   Eyes:      Extraocular Movements: Extraocular movements intact.      Pupils: Pupils are equal, round, and reactive to light.   Pulmonary:      Effort: Pulmonary effort is normal.      Comments: Decreased breath sounds , no adventitious sounds   Neurological:      Mental Status: He is alert.          PAT AIRWAY:   Airway:     Mallampati::  II   Denies loose teeth , has missing teeth    /80   Pulse 75   Temp 36.1 °C (97 °F)   Resp 16   Ht 1.676 m (5' 6\")   Wt 54.6 kg (120 lb 5.9 oz)   SpO2 100%   BMI 19.43 kg/m²     EKG NSR , LVH    STRESS TEST 2022: NEGATIVE FOR ISCHEMIA     Lab Results   Component Value Date    WBC 9.9 02/29/2024    HGB 14.9 02/29/2024    HCT 46.0 02/29/2024    MCV 95 02/29/2024     02/29/2024     Results from last 7 days   Lab Units 02/29/24  1448   SODIUM mmol/L 135*   POTASSIUM mmol/L 5.4*   CHLORIDE mmol/L 100   CO2 mmol/L 30   BUN mg/dL 12   CREATININE mg/dL 0.74   CALCIUM mg/dL 10.1   PROTEIN TOTAL g/dL 7.3   BILIRUBIN TOTAL mg/dL 0.3   ALK PHOS U/L 116   ALT U/L 13   AST U/L 21   GLUCOSE mg/dL 109*       ASSESSMENT/PLAN    Patient is a 58year-old  scheduled for Left Shoulder Arthroscopy;  Rotator Cuff Repair  with Dr. ISRAEL  on  3/11/24 .  CARDIOVASCULAR:  RCRI score / Risk: The patients score is 0 based on history . Per ACC/AHA guidelines this places him  at  3.9% risk for MACE undergoing a low  risk " procedure . The patient has the following risk factors: denies   Functional Capacity: The patients exercise tolerance is  4  METS. This is based on the patients. Patient denies  active cardiac symptoms or anginal equivalents .      PULMONARY:  The patient has the following factors that place them at increased risk of perioperative pulmonary complications;Hx bleb/bullae/moderate copd/GA/ nocturnal hypoxia/ cigartte smoker greater than 2.5 hour procedure.  Postoperatively the patient would benefit from early pulmonary toilet/incentive spirometry q 1-2 hours while awake/pulse oximetry/cautious use of respiratory depressant medications such as opioids/elevate the HOB/oral hygiene.      MODERATE COPD/NOCTURNAL HYPOXIA:  PFTs show moderate copd with significant air trapping. CT shows bullae and bleb .  Pt is an active smoker. Advised to decrease cigarette consumption from now until DOS. Use inhalers as prescribed   Pulmonary risk assessment and recommendations requested from Dr Pietro Carlin.      We have received pulmonary  risk stratification from the patient's pulmonologist  on 3/5/24.Dr. Carlin states      The following addendum is made as per ortho request for pre-op risk assessment:     Based on the history of COPD, nocturnal hypoxia and recent smoking history he is a high risk patient going for a high risk surgery (orthopedic surgery). This overall places him at a high risk of developing niko-op pulmonary complications. However there is no absolute contraindications. Would recommends:  Early ambulation  Continue inhalers during the niko-op period  DVT prophylaxis  Bronchopulmonary toileting with Acapella, incentive spirometer.     MILD HYPERKALEMIA:  Serum K+ at 5.4. Pt is Asymptomatic. Pt is taking 800 mg of Ibuprofen. Recommend repeat BMP am of surgery .    DVT:  CAPRINI SCORE=7  The patient has the following factors that increase his  Risk for thrombus formation ; Virchow's triad ,age>55, smoker, copd,   , Surgical procedure >2 hrs  procedure .    Recommendations: DVT prophylaxis  per Dr. Leach  protocol . SCD's, BOWEN's, and early ambulation are recommended. Heparin or LMWH is recommended for the very high risk .      Risk assessment complete.  Patient is scheduled for  low  surgical risk procedure.  Patient is considered an increased, not prohibitive  risk to proceed with the planned procedure.      Preoperative medication instructions were provided and reviewed with the patient.  Any additional testing or evaluation was explained to the patient.  Nothing by mouth instructions were discussed and patient's questions were answered prior to conclusion to this encounter.  Patient verbalized understanding of preoperative instructions given in preadmission testing; discharge instructions available in EMR.

## 2024-02-29 NOTE — H&P (VIEW-ONLY)
CPM/PAT Evaluation       Name: Alber Reese (Alber Reese)  /Age: 1965/58 y.o.       SURGEON :DR HARESH ISRAEL     Surgery, Date, and Length:  Left Shoulder Arthroscopy;  Rotator Cuff Repair , 3/11/24    HPI:  This a 58 y.o. male who presents for presurgical evaluation for  for above mentioned procedure.Pt with Full Thickness Rotator cuff tear. Difficulty with over head activities.  . After discussion of the risks and benefits with Dr. ISRAEL the patient elects to proceed with the planned procedure.   Left Shoulder Arthroscopy;  Rotator Cuff Repair     Past Medical History:   Diagnosis Date    Bleb, lung (CMS/[facility])     s/p resection in     Cigarette smoker     COPD (chronic obstructive pulmonary disease) (CMS/[facility])     Emphysema lung (CMS/[facility])     H/O alcohol abuse     Lung nodule     Nocturnal hypoxemia     Wears O2 QHS    Other specified health status 2020    No pertinent past medical history    Requires continuous at home supplemental oxygen     2.5 L QHS    Sleep-disordered breathing        Past Surgical History:   Procedure Laterality Date    BRONCHOSCOPY  2022    CARDIOVASCULAR STRESS TEST  10/14/2022    IMPRESSION: 1. There is no evidence of ischemia or prior infarction. 2. The left ventricle is normal in size. 3. Normal LV wall motion with stress LV EF estimated at 52%.    CT GUIDED PERCUTANEOUS BIOPSY LUNG  11/10/2022    CT GUIDED PERCUTANEOUS BIOPSY LUNG 11/10/2022 [facility]    ECHOCARDIOGRAM 2 D M MODE PANEL  2021    CONCLUSIONS:  1. The left ventricular systolic function is normal with a 55-60% estimated ejection fraction.  2. The left atrium is moderate to severely dilated.    HERNIA REPAIR      KNEE SURGERY Right     LUNG REMOVAL, PARTIAL      blebs s/p resection     Anesthesia History  Pt denies any past history of anesthetic complications such as PONV, awareness, prolonged sedation, dental damage, aspiration, cardiac arrest, difficult intubation, difficult I.V. access  or unexpected hospital admissions.  NO malignant hyperthermia and or pseudo cholinesterase deficiency.    The patient is not  a Zoroastrian and will accept blood and blood products if medically indicated.   No history of blood transfusions .Type and screen not sent.     Social History  Social History     Substance and Sexual Activity   Drug Use Never      Social History     Substance and Sexual Activity   Alcohol Use Yes    Alcohol/week: 6.0 standard drinks of alcohol    Types: 6 Cans of beer per week      Social History     Tobacco Use   Smoking Status Every Day    Packs/day: 1.00    Years: 40.00    Additional pack years: 0.00    Total pack years: 40.00    Types: Cigarettes   Smokeless Tobacco Never   Tobacco Comments    10 cigs/day trying to quit           Family History   Problem Relation Name Age of Onset    Lung cancer Father      No Known Problems Sister      No Known Problems Brother         Allergies   Allergen Reactions    Morphine Itching       Prior to Admission medications    Medication Sig Start Date End Date Taking? Authorizing Provider   acetaminophen (Tylenol) 500 mg tablet Take 2 tablets (1,000 mg) by mouth 3 times a day. 2/16/22   Historical Provider, MD   albuterol 2.5 mg /3 mL (0.083 %) nebulizer solution Take 3 mL (2.5 mg) by nebulization 4 times a day as needed for wheezing or shortness of breath. 1/31/24   Sully Carlin MD   budesonide-formoteroL (Symbicort) 160-4.5 mcg/actuation inhaler Inhale 2 puffs 2 times a day. RINSE MOUTH AFTER USE. GO TO EMERGENCY ROOM IF CHEST PAIN OR PALPITATIONS OR FEELING WORSE OR CON 10/27/23 11/26/23  Sully Carlin MD   chlorhexidine (Peridex) 0.12 % solution Use 15 mL in the mouth or throat once daily for 2 days. 2/29/24 3/2/24  Patricia Crowder, APRN-CNP   citalopram (CeleXA) 10 mg tablet Take 1 tablet (10 mg) by mouth once daily. 5/26/23   Historical Provider, MD   diazePAM (Valium) 2 mg tablet Take 1 tablet (2 mg) by mouth once daily as  needed. 10/24/23   Historical Provider, MD   diazePAM (Valium) 5 mg tablet Take by mouth. TAKE 1 TABLET 1 HOUR PRIOR TO PROCEDURE. 9/15/22   Historical Provider, MD   gabapentin (Neurontin) 100 mg capsule Take by mouth. 3/9/22   Historical Provider, MD   gabapentin (Neurontin) 300 mg capsule Take by mouth. 4/12/22   Historical Provider, MD    mg tablet Take 1 tablet (800 mg) by mouth 3 times a day with meals. prn 10/24/23   Historical Provider, MD   ketorolac (Acular) 0.5 % ophthalmic solution Administer into affected eye(s). 5/5/21   Historical Provider, MD   methocarbamol (Robaxin) 500 mg tablet Take 1 tablet (500 mg) by mouth 3 times a day as needed (pain). 4/12/22   Historical Provider, MD   mirtazapine (Remeron) 15 mg tablet Take 1 tablet (15 mg) by mouth once daily at bedtime. 10/24/23   Historical Provider, MD   montelukast (Singulair) 10 mg tablet Take 1 tablet (10 mg) by mouth once daily at bedtime. STOP IF MOOD CHANGES.  Patient not taking: Reported on 12/19/2023 10/27/23 12/26/23  Sully Carlin MD   nicotine polacrilex (Commit) 2 mg lozenge ALLOW 1 LOZENGE TO DISSOLVE SLOWLY IN MOUTH AS DIRECTED    Historical Provider, MD   nicotine polacrilex (Nicorette) 2 mg gum CHEW 1 PIECE SLOWLY AND INTERMITTENTLY FOR 30 MINUTES REPEAT EVERY 1 TO 2 HOURS; MAXIMUM OF 24 PIECES A DAY 6/13/23   Historical Provider, MD   nitroglycerin (Nitrostat) 0.4 mg SL tablet DISSOLVE ONE TABLET UNDER TONGUE every 5 minutes for up to 3 doses as needed for chest pain. call 911 if pain persists.    Historical Provider, MD   rOPINIRole (Requip) 1 mg tablet Take 1 tablet (1 mg) by mouth 3 times a day. 5/27/23   Historical Provider, MD   Spiriva with HandiHaler 18 mcg inhalation capsule Place 1 capsule (18 mcg) into inhaler and inhale once daily. GO TO ER IF VISUAL/EYE PAIN OR URINARY RETENTION OR PALPITATIONS OR CHEST PAIN OR    Historical Provider, MD   Ventolin HFA 90 mcg/actuation inhaler Inhale 1-2 puffs every 6 hours if  "needed for wheezing or shortness of breath. EVERY 4 TO 6 HOURS AS NEEDED 1/31/24   Sully Carlin MD        PAT ROS:   Constitutional:   neg    Neuro/Psych:   neg    Eyes:   Ears:   Nose:   Mouth:   neg    Throat:   neg    Neck:   neg    Cardio:   neg    Respiratory:    wheezing  Endocrine:   GI:   neg    :   neg    Musculoskeletal:    arthralgias  Hematologic:   neg    Skin:  neg        Physical Exam  Vitals reviewed.   Constitutional:       Appearance: He is ill-appearing.   HENT:      Head: Normocephalic.      Mouth/Throat:      Mouth: Mucous membranes are dry.   Eyes:      Extraocular Movements: Extraocular movements intact.      Pupils: Pupils are equal, round, and reactive to light.   Pulmonary:      Effort: Pulmonary effort is normal.      Comments: Decreased breath sounds , no adventitious sounds   Neurological:      Mental Status: He is alert.          PAT AIRWAY:   Airway:     Mallampati::  II   Denies loose teeth , has missing teeth    /80   Pulse 75   Temp 36.1 °C (97 °F)   Resp 16   Ht 1.676 m (5' 6\")   Wt 54.6 kg (120 lb 5.9 oz)   SpO2 100%   BMI 19.43 kg/m²     EKG NSR , LVH    STRESS TEST 2022: NEGATIVE FOR ISCHEMIA     Lab Results   Component Value Date    WBC 9.9 02/29/2024    HGB 14.9 02/29/2024    HCT 46.0 02/29/2024    MCV 95 02/29/2024     02/29/2024     Results from last 7 days   Lab Units 02/29/24  1448   SODIUM mmol/L 135*   POTASSIUM mmol/L 5.4*   CHLORIDE mmol/L 100   CO2 mmol/L 30   BUN mg/dL 12   CREATININE mg/dL 0.74   CALCIUM mg/dL 10.1   PROTEIN TOTAL g/dL 7.3   BILIRUBIN TOTAL mg/dL 0.3   ALK PHOS U/L 116   ALT U/L 13   AST U/L 21   GLUCOSE mg/dL 109*       ASSESSMENT/PLAN    Patient is a 58year-old  scheduled for Left Shoulder Arthroscopy;  Rotator Cuff Repair  with Dr. ISRAEL  on  3/11/24 .  CARDIOVASCULAR:  RCRI score / Risk: The patients score is 0 based on history . Per ACC/AHA guidelines this places him  at  3.9% risk for MACE undergoing a low  risk " procedure . The patient has the following risk factors: denies   Functional Capacity: The patients exercise tolerance is  4  METS. This is based on the patients. Patient denies  active cardiac symptoms or anginal equivalents .      PULMONARY:  The patient has the following factors that place them at increased risk of perioperative pulmonary complications;Hx bleb/bullae/moderate copd/GA/ nocturnal hypoxia/ cigartte smoker greater than 2.5 hour procedure.  Postoperatively the patient would benefit from early pulmonary toilet/incentive spirometry q 1-2 hours while awake/pulse oximetry/cautious use of respiratory depressant medications such as opioids/elevate the HOB/oral hygiene.      MODERATE COPD/NOCTURNAL HYPOXIA:  PFTs show moderate copd with significant air trapping. CT shows bullae and bleb .  Pt is an active smoker. Advised to decrease cigarette consumption from now until DOS. Use inhalers as prescribed   Pulmonary risk assessment and recommendations requested from Dr Pietro Carlin.      We have received pulmonary  risk stratification from the patient's pulmonologist  on 3/5/24.Dr. Carlin states      The following addendum is made as per ortho request for pre-op risk assessment:     Based on the history of COPD, nocturnal hypoxia and recent smoking history he is a high risk patient going for a high risk surgery (orthopedic surgery). This overall places him at a high risk of developing niko-op pulmonary complications. However there is no absolute contraindications. Would recommends:  Early ambulation  Continue inhalers during the niko-op period  DVT prophylaxis  Bronchopulmonary toileting with Acapella, incentive spirometer.     MILD HYPERKALEMIA:  Serum K+ at 5.4. Pt is Asymptomatic. Pt is taking 800 mg of Ibuprofen. Recommend repeat BMP am of surgery .    DVT:  CAPRINI SCORE=7  The patient has the following factors that increase his  Risk for thrombus formation ; Virchow's triad ,age>55, smoker, copd,   , Surgical procedure >2 hrs  procedure .    Recommendations: DVT prophylaxis  per Dr. Leach  protocol . SCD's, BOWEN's, and early ambulation are recommended. Heparin or LMWH is recommended for the very high risk .      Risk assessment complete.  Patient is scheduled for  low  surgical risk procedure.  Patient is considered an increased, not prohibitive  risk to proceed with the planned procedure.      Preoperative medication instructions were provided and reviewed with the patient.  Any additional testing or evaluation was explained to the patient.  Nothing by mouth instructions were discussed and patient's questions were answered prior to conclusion to this encounter.  Patient verbalized understanding of preoperative instructions given in preadmission testing; discharge instructions available in EMR.

## 2024-03-01 ENCOUNTER — APPOINTMENT (OUTPATIENT)
Dept: RADIOLOGY | Facility: CLINIC | Age: 59
End: 2024-03-01
Payer: MEDICAID

## 2024-03-01 LAB
ATRIAL RATE: 65 BPM
P AXIS: 78 DEGREES
P OFFSET: 202 MS
P ONSET: 157 MS
PR INTERVAL: 138 MS
Q ONSET: 226 MS
QRS COUNT: 11 BEATS
QRS DURATION: 84 MS
QT INTERVAL: 404 MS
QTC CALCULATION(BAZETT): 420 MS
QTC FREDERICIA: 414 MS
R AXIS: 73 DEGREES
T AXIS: 68 DEGREES
T OFFSET: 428 MS
VENTRICULAR RATE: 65 BPM

## 2024-03-01 PROCEDURE — 93005 ELECTROCARDIOGRAM TRACING: CPT

## 2024-03-02 LAB — STAPHYLOCOCCUS SPEC CULT: ABNORMAL

## 2024-03-09 ENCOUNTER — ANESTHESIA EVENT (OUTPATIENT)
Dept: OPERATING ROOM | Facility: HOSPITAL | Age: 59
End: 2024-03-09
Payer: MEDICAID

## 2024-03-11 ENCOUNTER — HOSPITAL ENCOUNTER (OUTPATIENT)
Facility: HOSPITAL | Age: 59
Setting detail: OUTPATIENT SURGERY
Discharge: HOME | End: 2024-03-11
Attending: STUDENT IN AN ORGANIZED HEALTH CARE EDUCATION/TRAINING PROGRAM | Admitting: STUDENT IN AN ORGANIZED HEALTH CARE EDUCATION/TRAINING PROGRAM
Payer: MEDICAID

## 2024-03-11 ENCOUNTER — PHARMACY VISIT (OUTPATIENT)
Dept: PHARMACY | Facility: CLINIC | Age: 59
End: 2024-03-11
Payer: MEDICAID

## 2024-03-11 ENCOUNTER — ANESTHESIA (OUTPATIENT)
Dept: OPERATING ROOM | Facility: HOSPITAL | Age: 59
End: 2024-03-11
Payer: MEDICAID

## 2024-03-11 VITALS
DIASTOLIC BLOOD PRESSURE: 79 MMHG | TEMPERATURE: 97.3 F | SYSTOLIC BLOOD PRESSURE: 134 MMHG | BODY MASS INDEX: 19.35 KG/M2 | RESPIRATION RATE: 16 BRPM | WEIGHT: 120.37 LBS | HEIGHT: 66 IN | HEART RATE: 74 BPM | OXYGEN SATURATION: 96 %

## 2024-03-11 DIAGNOSIS — M75.122 COMPLETE TEAR OF LEFT ROTATOR CUFF, UNSPECIFIED WHETHER TRAUMATIC: Primary | ICD-10-CM

## 2024-03-11 DIAGNOSIS — M75.22 BICEPS TENDINITIS OF LEFT SHOULDER: ICD-10-CM

## 2024-03-11 PROCEDURE — 29827 SHO ARTHRS SRG RT8TR CUF RPR: CPT | Performed by: STUDENT IN AN ORGANIZED HEALTH CARE EDUCATION/TRAINING PROGRAM

## 2024-03-11 PROCEDURE — 2500000004 HC RX 250 GENERAL PHARMACY W/ HCPCS (ALT 636 FOR OP/ED): Performed by: NURSE ANESTHETIST, CERTIFIED REGISTERED

## 2024-03-11 PROCEDURE — 3600000004 HC OR TIME - INITIAL BASE CHARGE - PROCEDURE LEVEL FOUR: Performed by: STUDENT IN AN ORGANIZED HEALTH CARE EDUCATION/TRAINING PROGRAM

## 2024-03-11 PROCEDURE — 2500000005 HC RX 250 GENERAL PHARMACY W/O HCPCS: Performed by: NURSE ANESTHETIST, CERTIFIED REGISTERED

## 2024-03-11 PROCEDURE — 3700000002 HC GENERAL ANESTHESIA TIME - EACH INCREMENTAL 1 MINUTE: Performed by: STUDENT IN AN ORGANIZED HEALTH CARE EDUCATION/TRAINING PROGRAM

## 2024-03-11 PROCEDURE — 7100000009 HC PHASE TWO TIME - INITIAL BASE CHARGE: Performed by: STUDENT IN AN ORGANIZED HEALTH CARE EDUCATION/TRAINING PROGRAM

## 2024-03-11 PROCEDURE — 3700000001 HC GENERAL ANESTHESIA TIME - INITIAL BASE CHARGE: Performed by: STUDENT IN AN ORGANIZED HEALTH CARE EDUCATION/TRAINING PROGRAM

## 2024-03-11 PROCEDURE — 2500000004 HC RX 250 GENERAL PHARMACY W/ HCPCS (ALT 636 FOR OP/ED): Performed by: STUDENT IN AN ORGANIZED HEALTH CARE EDUCATION/TRAINING PROGRAM

## 2024-03-11 PROCEDURE — 2500000002 HC RX 250 W HCPCS SELF ADMINISTERED DRUGS (ALT 637 FOR MEDICARE OP, ALT 636 FOR OP/ED): Performed by: NURSE ANESTHETIST, CERTIFIED REGISTERED

## 2024-03-11 PROCEDURE — 29823 SHO ARTHRS SRG XTNSV DBRDMT: CPT | Performed by: STUDENT IN AN ORGANIZED HEALTH CARE EDUCATION/TRAINING PROGRAM

## 2024-03-11 PROCEDURE — RXMED WILLOW AMBULATORY MEDICATION CHARGE

## 2024-03-11 PROCEDURE — 7100000001 HC RECOVERY ROOM TIME - INITIAL BASE CHARGE: Performed by: STUDENT IN AN ORGANIZED HEALTH CARE EDUCATION/TRAINING PROGRAM

## 2024-03-11 PROCEDURE — 2780000003 HC OR 278 NO HCPCS: Performed by: STUDENT IN AN ORGANIZED HEALTH CARE EDUCATION/TRAINING PROGRAM

## 2024-03-11 PROCEDURE — 7100000002 HC RECOVERY ROOM TIME - EACH INCREMENTAL 1 MINUTE: Performed by: STUDENT IN AN ORGANIZED HEALTH CARE EDUCATION/TRAINING PROGRAM

## 2024-03-11 PROCEDURE — 2720000007 HC OR 272 NO HCPCS: Performed by: STUDENT IN AN ORGANIZED HEALTH CARE EDUCATION/TRAINING PROGRAM

## 2024-03-11 PROCEDURE — 29828 SHO ARTHRS SRG BICP TENODSIS: CPT | Performed by: STUDENT IN AN ORGANIZED HEALTH CARE EDUCATION/TRAINING PROGRAM

## 2024-03-11 PROCEDURE — 7100000010 HC PHASE TWO TIME - EACH INCREMENTAL 1 MINUTE: Performed by: STUDENT IN AN ORGANIZED HEALTH CARE EDUCATION/TRAINING PROGRAM

## 2024-03-11 PROCEDURE — 3600000009 HC OR TIME - EACH INCREMENTAL 1 MINUTE - PROCEDURE LEVEL FOUR: Performed by: STUDENT IN AN ORGANIZED HEALTH CARE EDUCATION/TRAINING PROGRAM

## 2024-03-11 PROCEDURE — C1713 ANCHOR/SCREW BN/BN,TIS/BN: HCPCS | Performed by: STUDENT IN AN ORGANIZED HEALTH CARE EDUCATION/TRAINING PROGRAM

## 2024-03-11 DEVICE — ANCHOR, SWIVEL LOCK, BIOMPOSITE, KNOTLESS, 4.75MM X 19.1MM, W/P2 SUTURE BLUE: Type: IMPLANTABLE DEVICE | Site: SHOULDER | Status: FUNCTIONAL

## 2024-03-11 DEVICE — ANCHOR, FIBERTAK, W/ TWO 1.3MM SUTURE TAPE, (WH/BL/ & WH/BLK: Type: IMPLANTABLE DEVICE | Site: SHOULDER | Status: FUNCTIONAL

## 2024-03-11 DEVICE — IMPLANTABLE DEVICE: Type: IMPLANTABLE DEVICE | Site: SHOULDER | Status: FUNCTIONAL

## 2024-03-11 RX ORDER — ROCURONIUM BROMIDE 10 MG/ML
INJECTION, SOLUTION INTRAVENOUS AS NEEDED
Status: DISCONTINUED | OUTPATIENT
Start: 2024-03-11 | End: 2024-03-11

## 2024-03-11 RX ORDER — ASPIRIN 81 MG/1
81 TABLET ORAL DAILY
Qty: 14 TABLET | Refills: 0 | Status: SHIPPED | OUTPATIENT
Start: 2024-03-11 | End: 2024-03-25

## 2024-03-11 RX ORDER — SODIUM CHLORIDE, SODIUM LACTATE, POTASSIUM CHLORIDE, CALCIUM CHLORIDE 600; 310; 30; 20 MG/100ML; MG/100ML; MG/100ML; MG/100ML
100 INJECTION, SOLUTION INTRAVENOUS CONTINUOUS
Status: CANCELLED | OUTPATIENT
Start: 2024-03-11

## 2024-03-11 RX ORDER — SODIUM CHLORIDE, SODIUM LACTATE, POTASSIUM CHLORIDE, CALCIUM CHLORIDE 600; 310; 30; 20 MG/100ML; MG/100ML; MG/100ML; MG/100ML
100 INJECTION, SOLUTION INTRAVENOUS CONTINUOUS
Status: DISCONTINUED | OUTPATIENT
Start: 2024-03-11 | End: 2024-03-11 | Stop reason: HOSPADM

## 2024-03-11 RX ORDER — PHENYLEPHRINE HCL IN 0.9% NACL 1 MG/10 ML
SYRINGE (ML) INTRAVENOUS AS NEEDED
Status: DISCONTINUED | OUTPATIENT
Start: 2024-03-11 | End: 2024-03-11

## 2024-03-11 RX ORDER — KETOROLAC TROMETHAMINE 30 MG/ML
INJECTION, SOLUTION INTRAMUSCULAR; INTRAVENOUS AS NEEDED
Status: DISCONTINUED | OUTPATIENT
Start: 2024-03-11 | End: 2024-03-11

## 2024-03-11 RX ORDER — HYDRALAZINE HYDROCHLORIDE 20 MG/ML
5 INJECTION INTRAMUSCULAR; INTRAVENOUS EVERY 30 MIN PRN
Status: DISCONTINUED | OUTPATIENT
Start: 2024-03-11 | End: 2024-03-11 | Stop reason: HOSPADM

## 2024-03-11 RX ORDER — ACETAMINOPHEN 500 MG
500 TABLET ORAL EVERY 6 HOURS PRN
Qty: 30 TABLET | Refills: 2 | Status: SHIPPED | OUTPATIENT
Start: 2024-03-11

## 2024-03-11 RX ORDER — LIDOCAINE HCL/PF 100 MG/5ML
SYRINGE (ML) INTRAVENOUS AS NEEDED
Status: DISCONTINUED | OUTPATIENT
Start: 2024-03-11 | End: 2024-03-11

## 2024-03-11 RX ORDER — MIDAZOLAM HYDROCHLORIDE 5 MG/ML
INJECTION INTRAMUSCULAR; INTRAVENOUS AS NEEDED
Status: DISCONTINUED | OUTPATIENT
Start: 2024-03-11 | End: 2024-03-11

## 2024-03-11 RX ORDER — CEFAZOLIN 1 G/1
INJECTION, POWDER, FOR SOLUTION INTRAVENOUS AS NEEDED
Status: DISCONTINUED | OUTPATIENT
Start: 2024-03-11 | End: 2024-03-11

## 2024-03-11 RX ORDER — ALBUTEROL SULFATE 90 UG/1
AEROSOL, METERED RESPIRATORY (INHALATION) AS NEEDED
Status: DISCONTINUED | OUTPATIENT
Start: 2024-03-11 | End: 2024-03-11

## 2024-03-11 RX ORDER — CEFAZOLIN SODIUM 2 G/100ML
2 INJECTION, SOLUTION INTRAVENOUS EVERY 8 HOURS
Status: CANCELLED | OUTPATIENT
Start: 2024-03-11

## 2024-03-11 RX ORDER — FENTANYL CITRATE 50 UG/ML
INJECTION, SOLUTION INTRAMUSCULAR; INTRAVENOUS AS NEEDED
Status: DISCONTINUED | OUTPATIENT
Start: 2024-03-11 | End: 2024-03-11

## 2024-03-11 RX ORDER — OXYCODONE AND ACETAMINOPHEN 5; 325 MG/1; MG/1
1 TABLET ORAL EVERY 6 HOURS PRN
Qty: 28 TABLET | Refills: 0 | Status: SHIPPED | OUTPATIENT
Start: 2024-03-11 | End: 2024-03-15 | Stop reason: SDUPTHER

## 2024-03-11 RX ORDER — PROPOFOL 10 MG/ML
INJECTION, EMULSION INTRAVENOUS AS NEEDED
Status: DISCONTINUED | OUTPATIENT
Start: 2024-03-11 | End: 2024-03-11

## 2024-03-11 RX ORDER — ONDANSETRON HYDROCHLORIDE 2 MG/ML
4 INJECTION, SOLUTION INTRAVENOUS ONCE AS NEEDED
Status: DISCONTINUED | OUTPATIENT
Start: 2024-03-11 | End: 2024-03-11 | Stop reason: HOSPADM

## 2024-03-11 RX ORDER — SODIUM CHLORIDE, SODIUM LACTATE, POTASSIUM CHLORIDE, AND CALCIUM CHLORIDE .6; .31; .03; .02 G/100ML; G/100ML; G/100ML; G/100ML
IRRIGANT IRRIGATION AS NEEDED
Status: DISCONTINUED | OUTPATIENT
Start: 2024-03-11 | End: 2024-03-11 | Stop reason: HOSPADM

## 2024-03-11 RX ORDER — LIDOCAINE HYDROCHLORIDE 10 MG/ML
0.1 INJECTION, SOLUTION EPIDURAL; INFILTRATION; INTRACAUDAL; PERINEURAL ONCE
Status: DISCONTINUED | OUTPATIENT
Start: 2024-03-11 | End: 2024-03-11 | Stop reason: HOSPADM

## 2024-03-11 RX ORDER — ONDANSETRON HYDROCHLORIDE 2 MG/ML
INJECTION, SOLUTION INTRAVENOUS AS NEEDED
Status: DISCONTINUED | OUTPATIENT
Start: 2024-03-11 | End: 2024-03-11

## 2024-03-11 RX ORDER — OXYCODONE HYDROCHLORIDE 5 MG/1
5 TABLET ORAL EVERY 4 HOURS PRN
Status: DISCONTINUED | OUTPATIENT
Start: 2024-03-11 | End: 2024-03-11 | Stop reason: HOSPADM

## 2024-03-11 RX ORDER — ALBUTEROL SULFATE 0.83 MG/ML
2.5 SOLUTION RESPIRATORY (INHALATION) ONCE AS NEEDED
Status: DISCONTINUED | OUTPATIENT
Start: 2024-03-11 | End: 2024-03-11 | Stop reason: HOSPADM

## 2024-03-11 RX ORDER — DEXAMETHASONE SODIUM PHOSPHATE 4 MG/ML
INJECTION, SOLUTION INTRA-ARTICULAR; INTRALESIONAL; INTRAMUSCULAR; INTRAVENOUS; SOFT TISSUE AS NEEDED
Status: DISCONTINUED | OUTPATIENT
Start: 2024-03-11 | End: 2024-03-11

## 2024-03-11 RX ORDER — SODIUM CHLORIDE, SODIUM LACTATE, POTASSIUM CHLORIDE, CALCIUM CHLORIDE 600; 310; 30; 20 MG/100ML; MG/100ML; MG/100ML; MG/100ML
INJECTION, SOLUTION INTRAVENOUS CONTINUOUS PRN
Status: DISCONTINUED | OUTPATIENT
Start: 2024-03-11 | End: 2024-03-11

## 2024-03-11 RX ORDER — LABETALOL HYDROCHLORIDE 5 MG/ML
5 INJECTION, SOLUTION INTRAVENOUS ONCE AS NEEDED
Status: DISCONTINUED | OUTPATIENT
Start: 2024-03-11 | End: 2024-03-11 | Stop reason: HOSPADM

## 2024-03-11 RX ADMIN — LIDOCAINE HYDROCHLORIDE 100 MG: 20 INJECTION INTRAVENOUS at 07:49

## 2024-03-11 RX ADMIN — ALBUTEROL SULFATE 6 PUFF: 90 AEROSOL, METERED RESPIRATORY (INHALATION) at 09:49

## 2024-03-11 RX ADMIN — KETOROLAC TROMETHAMINE 30 MG: 30 INJECTION, SOLUTION INTRAMUSCULAR at 09:40

## 2024-03-11 RX ADMIN — PROPOFOL 150 MG: 10 INJECTION, EMULSION INTRAVENOUS at 07:49

## 2024-03-11 RX ADMIN — MIDAZOLAM HYDROCHLORIDE 2 MG: 5 INJECTION, SOLUTION INTRAMUSCULAR; INTRAVENOUS at 07:27

## 2024-03-11 RX ADMIN — Medication 100 MCG: at 08:31

## 2024-03-11 RX ADMIN — FENTANYL CITRATE 50 MCG: 50 INJECTION, SOLUTION INTRAMUSCULAR; INTRAVENOUS at 07:27

## 2024-03-11 RX ADMIN — ONDANSETRON 4 MG: 2 INJECTION INTRAMUSCULAR; INTRAVENOUS at 08:00

## 2024-03-11 RX ADMIN — CEFAZOLIN 2 G: 1 INJECTION, POWDER, FOR SOLUTION INTRAMUSCULAR; INTRAVENOUS at 07:55

## 2024-03-11 RX ADMIN — MIDAZOLAM HYDROCHLORIDE 2 MG: 5 INJECTION, SOLUTION INTRAMUSCULAR; INTRAVENOUS at 07:30

## 2024-03-11 RX ADMIN — ROCURONIUM BROMIDE 20 MG: 10 INJECTION, SOLUTION INTRAVENOUS at 09:25

## 2024-03-11 RX ADMIN — ROCURONIUM BROMIDE 60 MG: 10 INJECTION, SOLUTION INTRAVENOUS at 07:49

## 2024-03-11 RX ADMIN — SODIUM CHLORIDE, POTASSIUM CHLORIDE, SODIUM LACTATE AND CALCIUM CHLORIDE: 600; 310; 30; 20 INJECTION, SOLUTION INTRAVENOUS at 07:35

## 2024-03-11 RX ADMIN — Medication 100 MCG: at 08:05

## 2024-03-11 RX ADMIN — DEXAMETHASONE SODIUM PHOSPHATE 4 MG: 4 INJECTION, SOLUTION INTRAMUSCULAR; INTRAVENOUS at 08:00

## 2024-03-11 RX ADMIN — Medication 100 MCG: at 08:14

## 2024-03-11 RX ADMIN — SUGAMMADEX 200 MG: 100 INJECTION, SOLUTION INTRAVENOUS at 09:50

## 2024-03-11 SDOH — HEALTH STABILITY: MENTAL HEALTH: CURRENT SMOKER: 1

## 2024-03-11 ASSESSMENT — PAIN - FUNCTIONAL ASSESSMENT
PAIN_FUNCTIONAL_ASSESSMENT: 0-10

## 2024-03-11 ASSESSMENT — COLUMBIA-SUICIDE SEVERITY RATING SCALE - C-SSRS
6. HAVE YOU EVER DONE ANYTHING, STARTED TO DO ANYTHING, OR PREPARED TO DO ANYTHING TO END YOUR LIFE?: NO
1. IN THE PAST MONTH, HAVE YOU WISHED YOU WERE DEAD OR WISHED YOU COULD GO TO SLEEP AND NOT WAKE UP?: NO
2. HAVE YOU ACTUALLY HAD ANY THOUGHTS OF KILLING YOURSELF?: NO

## 2024-03-11 ASSESSMENT — PAIN SCALES - GENERAL
PAINLEVEL_OUTOF10: 0 - NO PAIN
PAINLEVEL_OUTOF10: 10 - WORST POSSIBLE PAIN
PAINLEVEL_OUTOF10: 0 - NO PAIN

## 2024-03-11 NOTE — OP NOTE
Left Shoulder Arthroscopy;  Rotator Cuff Repair (L) Operative Note     Date: 3/11/2024  OR Location: [facility] A OR    Name: Alber Reese, : 1965, Age: 58 y.o., MRN: [id number], Sex: male    Diagnosis  Pre-op Diagnosis     * Complete tear of left rotator cuff, unspecified whether traumatic [M75.122] Post-op Diagnosis     * Complete tear of left rotator cuff, unspecified whether traumatic [M75.122]     * Biceps tendinitis of left shoulder [M75.22]     Procedures  Left Shoulder Arthroscopy;  Rotator Cuff Repair  85303 - AK SURGICAL ARTHROSCOPY SHOULDER W/ROTATOR CUFF RPR    AK SURGICAL ARTHROSCOPY SHOULDER XTNSV DBRDMT 3+ [37435]  AK SURGICAL ARTHROSCOPY SHOULDER BICEPS TENODESIS [41808]  Surgeons      * Nehemias Leach - Primary    Resident/Fellow/Other Assistant:  Surgeon(s) and Role:    Procedure Summary  Anesthesia: Consult  ASA: ASA status not filed in the log.  Anesthesia Staff: Anesthesiologist: Selwyn Wilkerson MD  CRNA: HELENE Marquez-CRNA  C-AA: PERICO Mccloud  Estimated Blood Loss: 10 mL  Intra-op Medications:   Administrations occurring from 0730 to 0930 on 24:   Medication Name Total Dose   EPINEPHrine (Adrenalin) 1 mg in lactated Ringer's 3,000 mL irrigation 1 mg   EPINEPHrine (Adrenalin) 1 mg in lactated Ringer's 3,000 mL irrigation 1 mg              Anesthesia Record               Intraprocedure I/O Totals       None           Specimen: No specimens collected     Staff:   Circulator: Juvencio Driver RN; Zahra Sheikh RN  Scrub Person: [facility]         Drains and/or Catheters: * None in log *    Tourniquet Times:         Implants:  Implants       Type Name Action Serial No.      Implant ANCHOR, SWIVEL LOCK, BIOMPOSITE, KNOTLESS, 4.75MM X 19.1MM, W/P2 SUTURE BLUE - SN/A - [id number] Implanted N/A     Implant ANCHOR, FIBERTAK, W/ TWO 1.3MM SUTURE TAPE, (WH/BL/ & WH/BLK - SN/A - [id number] Implanted N/A     Implant ANCHOR, FIBERTAK, W/ TWO 1.3MM SUTURE TAPE, (WH/BL/ &  WH/BLK - SN/A - [id number] Implanted N/A     [place] 4.75MM BIOCOMPOSITE KNOTLESS SWIVELLOCK ANCHOR, SELF PUNCHING BLUE #2 SUTURE Implanted N/A            INDICATIONS FOR PROCEDURE: The patient had significant pain and weakness in the affected extremity which was refractory to nonoperative measures and was diagnosed with rotator cuff tear of the left shoulder. Treatment options were discussed. The patient was proposed to have a shoulder arthroscopy and related procedures based on preoperative planning and intraoperative findings. Risks and benefits of surgery and alternatives of treatment were discussed.  The patient understood all the risks and benefits and wanted to proceed with surgical option.    DESCRIPTION OF PROCEDURE: Patient was met in the preoperative holding area. Consent for surgery was reviewed and the correct operative extremity was verified and marked. The patient then underwent a preoperative  nerve block, please see anesthesia records regarding this. The patient was then brought to the operating room and was placed in a supine position on the operating table.  SCDs were placed for DVT prophylaxis. General anesthesia was induced. The patient was placed in a beach chair position to around 90 degrees of inclination, and all the bony prominences were well padded. The operative upper extremity was prepped and draped in usual sterile fashion.  A time out was held confirming the correct patient, operative side, operative procedure, preoperative antibiotics, implants being present and that it was safe to proceed--all were in agreement it was safe to proceed.    Exam under anesthesia: Prior to incision, an EUA showed: Full passive range of motion    Diagnostic shoulder arthroscopy of the glenohumeral joint: A standard posterolateral arthroscopic portal was made approximately 2 cm medial and inferior to the posterolateral border of the acromion, at the soft spot of the glenohumeral joint. The arthroscope was  introduced into the glenohumeral joint. Under arthroscopic visualization, an anterior portal was made in the rotator interval. This was made by first visualizing the trajectory with a spinal needle and then utilizing a cannula from outside to inside to create this anterior portal along a similar path in the same trajectory through a small skin incision.    Diagnostic arthroscopy revealed:   Biceps tendon: Partial tearing and tenosynovitis  Subscapularis: Upper border tear, full-thickness  Anterior capsule: Significant synovitis  Glenoid cartilage: Mild delamination  Humeral head cartilage: Mild chondral delamination  Anterior labrum: Degenerative tearing  Superior labrum: Degenerative tearing  Posterior labrum: Intact  Axillary recess: No loose bodies  Inferior glenohumeral ligaments: Intact  Posterosuperior rotator cuff: High-grade, essentially full-thickness tearing of the supraspinatus and infraspinatus    A debridement was then performed on anterior capsule, anterior labrum, superior labrum, glenoid cartilage, humeral head cartilage.    Given the state of the biceps tendon and subscapularis, decision was made to proceed with a biceps tenodesis and subscapularis repair.  The subscapularis was cleared anteriorly and posteriorly.  A luggage tag stitch was placed in the upper border of the subscapularis.  In a similar fashion, a FiberLink suture was passed to the biceps to achieve excellent fixation.  These 2 sutures were then passed into a swivel lock anchor into the lesser tuberosity to achieve both biceps tenodesis as well as subscapularis repair    Subacromial bursectomy: Next, the arthroscope was introduced into the subacromial space. There was extensive synovitis and bursitis in this region with associated scarring. An extensive bursectomy and synovectomy was performed in the subacromial space using a combination of 4.5 mm shaver and the RF device through a lateral portal.     Rotator cuff repair: Attention  was then turned to repair of the rotator cuff tears. There was a large tear of the supraspinatus and infraspinatus tendons. In order to fully mobilize the tendons, soft tissue releases were performed anteriorly, superiorly, and inferiorly, essentially circumferentially around the tendons. This allowed for the tendon to be pulled over to the greater tuberosity. Next, two medial row double loaded Arthrex all suture anchors with SutureTape were placed, one anterior and one posterior at the articular margin. Utilizing the Scorpion suture passer and SutureTape grasper, suture strands were passed through the rotator cuff tissue from anterior to posterior in positions to reduce the torn cuff in a horizontal mattress configuration. Suture pairs were then tied using an arthroscopic knot pusher. This secured the medial row fixation. Next, 1 tail of each suture knot was brought to a lateral row Swivel Lock anchor placed anteriorly in the proximal humerus. This process was repeated for the remaining suture tails and a second lateral row Swivel Lock anchor was placed posteriorly. This completed the lateral row fixation. We then inspected our repair and felt that we had adequately repaired the rotator cuff onto its footprint on the greater tuberosity and that the rotator cuff was not under excessive tension in this location.    Closure of incisions:  After completion of a satisfactory debridement, the shoulder subacromial space was once again swept to remove any remaining loose bone fragments, synovitis, and potential scar tissue. Arthroscopic fluid was evacuated from the subacromial space joint and instruments were removed.    The portals were reapproximated and closed with 3-0 nylon suture. We confirmed that all counts were correct at the end of the case prior to and following closure. A clean sterile dressing consisting of xeroform, fluffs, ABD pad, and foam tape was applied. The drapes were then taken down. The patient was  then placed into a sling with an abduction pillow prior to being awoken from anesthesia.    Following the procedure, the patient was satisfactorily awakened from anesthesia and transferred to the postanesthesia care unit in stable condition.     ATTESTATION: Dr. Leach was present for the entirety of the procedure and personally performed all aspects of this procedure.    Dragon software was used to dictate this note, please be aware that minor errors in transcription may be present.    Nehemias Leach MD    Shoulder/Elbow Surgery  [facility]      Complications:  None; patient tolerated the procedure well.    Disposition: PACU - hemodynamically stable.  Condition: stable     Attending Attestation: I was present and scrubbed for the entire procedure.    Nehemias Leach  Phone Number: [phone]

## 2024-03-11 NOTE — DISCHARGE INSTRUCTIONS
Shoulder and Elbow Service  Nehemias Leach MD    Discharge Instructions after Arthroscopic Shoulder Repair     A sling has been provided for you. Remain in your sling at all times. This includes sleeping in your sling. *You may come out of your sling for daily exercises only.    Do not actively move your shoulder during this time. Active reaching and lifting away from the body are NOT permitted. You may use the operative arm for activities of daily living that do not require the operative arm to leave the side of the body. Such as: eating, drinking and bathing.    Remove your arm from the sling to perform elbow, wrist and hand range of motion Exercises 4-5x a day. This will help alleviate joint stiffness and swelling in your hand.   Use cryotherapy machine or ice on the shoulder intermittently over the first 72 hours up to the first 2 weeks following surgery.   Pain medication has been prescribed for you. Use your medicine liberally over the first 72 hours and only take if you are experiencing pain. You can then begin to taper your     use. You may take Extra Strength Tylenol or Tylenol only in place of the pain pills.   *DO NOT TAKE ANY nonsteroidal anti-inflammatory pain medications: Advil, Motrin, Ibuprofen, Aleve, Naproxen or Naprosyn. *UNLESS PRESCRIBED   You may remove your dressing after three days and leave open to air.   There will be sutures in place.    Do not use any aerosol deodorants or lotions on or near surgical incision(s).   You may shower 4 days after surgery. The incision(s) CANNOT get wet prior to 4 days. Simply allow the water to wash over the site and then pat dry. Do not rub the incision(s).   Take one aspirin (81 mg) daily for 2 weeks after surgery, unless you have an aspirin sensitivity or allergy, asthma or are on blood thinners.    Please call the office at [phone] for any problems. Including the following:  - Excessive redness of the incisions  - Drainage for more than 4 days  -  Fever of more than 101.5 F      Please call [phone] to make a follow-up appointment if one has not already been made      for you. You should see Dr Leach 10-14 days after your surgical procedure.     [facility]   [place] [facility]  [facility] [address]  [phone]

## 2024-03-11 NOTE — ANESTHESIA PROCEDURE NOTES
Airway  Date/Time: 3/11/2024 7:52 AM  Urgency: elective    Airway not difficult    Staffing  Performed: CRNA   Authorized by: Selwyn Wilkerson MD    Performed by: HELENE Marquez-ROXANE  Patient location during procedure: OR    Indications and Patient Condition  Indications for airway management: airway protection and anesthesia  Spontaneous Ventilation: absent  Sedation level: deep  Preoxygenated: yes  Patient position: sniffing  MILS maintained throughout  Mask difficulty assessment: 1 - vent by mask    Final Airway Details  Final airway type: endotracheal airway      Successful airway: ETT  Cuffed: yes   Successful intubation technique: direct laryngoscopy  Facilitating devices/methods: intubating stylet and cricoid pressure  Endotracheal tube insertion site: oral  Blade: Zeyad  Blade size: #4  ETT size (mm): 7.5  Cormack-Lehane Classification: grade I - full view of glottis  Placement verified by: chest auscultation and capnometry   Cuff volume (mL): 5  Measured from: lips  ETT to lips (cm): 23  Number of attempts at approach: 1  Ventilation between attempts: BVM    Additional Comments  Smooth IV induction, atraumatic DVL/intubation, teeth/lips intact.

## 2024-03-11 NOTE — PERIOPERATIVE NURSING NOTE
0957: Patient to bay with anesthesia present, plan of care reviewed. Report received from SOSA HOLLEY. Initial assessment complete.     1000: pt resting comfortably at this time. Pt denies any pain or discomfort at this time.    1015: pt tolerating sips of coffee.    1025: pt family updated.    1044: PHASE I END    1045: PHASE II START    1055: discharge instructions reviewed with pt and family. Pt and family verbalized understanding.    1100: RN assisted pt with dressing. IV removed without difficulty. IV cath intact upon removal. Pt tolerated removal well.    1118: Meds to [facility] bedside to speak with pt.    1125: pt transported to [facility] via wheelchair and transport.

## 2024-03-11 NOTE — ANESTHESIA PREPROCEDURE EVALUATION
Patient: Alber Reese    Procedure Information       Anesthesia Start Date/Time: 03/11/24 0735    Procedure: Left Shoulder Arthroscopy;  Rotator Cuff Repair (Left: Shoulder) - *Pre-op block*    Location: [facility] A OR 18 / [facility] A OR    Surgeons: Nehemias Leach MD          57 yo M hx COPD (moderate per imaging; no recent exacerbations, uses O2 at bedtime intermittently; able to go up flight stairs without stopping).  Pulmonology deemed him high risk.  Pt is still smoking 1ppd and surgeon is aware and Ok proceeding.      Other hx includes past ETOH abuse.  Denies any cardiac hx or CP.  Had stress test 2022 showing no ischemia.    Relevant Problems   Cardiovascular   (+) Chest pain      Neuro/Psych   (+) Sciatica of left side      Pulmonary   (+) Dyspnea on exertion   (+) Emphysema/COPD (CMS/HCC)   (+) SCOTT (obstructive sleep apnea)       Clinical information reviewed:   Tobacco  Allergies  Meds   Med Hx  Surg Hx   Fam Hx  Soc Hx        NPO Detail:  NPO/Void Status  Date of Last Liquid: 03/10/24  Time of Last Liquid: 2300  Date of Last Solid: 03/10/24  Time of Last Solid: 2300         Physical Exam    Airway  Mallampati: II  TM distance: >3 FB  Neck ROM: full     Cardiovascular   Rate: normal     Dental   Comments: Poor dentition   Pulmonary - normal exam     Abdominal            Anesthesia Plan    History of general anesthesia?: yes  History of complications of general anesthesia?: no    ASA 3     general   (Given pt is only on intermittent O2 NC at bedtime without no recent exacerbations, I feel the benefits of the interscalene block outweigh the risk (phrenic nerve))  The patient is a current smoker.    intravenous induction   Postoperative administration of opioids is intended.  Anesthetic plan and risks discussed with patient.

## 2024-03-11 NOTE — ANESTHESIA POSTPROCEDURE EVALUATION
Patient: Alber Reese    Procedure Summary       Date: 03/11/24 Room / Location: [facility] A OR 18 / [facility] A OR    Anesthesia Start: 0735 Anesthesia Stop: 1002    Procedure: Left Shoulder Arthroscopy;  Rotator Cuff Repair; Biceps tenodesis (Left: Shoulder) Diagnosis:       Complete tear of left rotator cuff, unspecified whether traumatic      Biceps tendinitis of left shoulder      (Complete tear of left rotator cuff, unspecified whether traumatic [M75.122])    Surgeons: Nehemias Leach MD Responsible Provider: Selwyn Wilkerson MD    Anesthesia Type: general ASA Status: 3            Anesthesia Type: general    Vitals Value Taken Time   /79 03/11/24 1044   Temp 36.3 °C (97.3 °F) 03/11/24 1044   Pulse 78 03/11/24 1044   Resp 17 03/11/24 1044   SpO2 95 % 03/11/24 1044       Anesthesia Post Evaluation    Patient participation: complete - patient participated  Level of consciousness: awake  Pain management: adequate  Airway patency: patent  Cardiovascular status: acceptable and hemodynamically stable  Respiratory status: acceptable  Hydration status: acceptable  Postoperative Nausea and Vomiting: none        No notable events documented.

## 2024-03-11 NOTE — ANESTHESIA PROCEDURE NOTES
Peripheral Block    Patient location during procedure: pre-op  Start time: 3/11/2024 7:27 AM  End time: 3/11/2024 7:37 AM  Reason for block: at surgeon's request and post-op pain management  Staffing  Performed: attending   Authorized by: Selwyn Wilkerson MD    Performed by: Selwyn Wilkerson MD  Preanesthetic Checklist  Completed: patient identified, IV checked, site marked, risks and benefits discussed, surgical consent, monitors and equipment checked, pre-op evaluation and timeout performed   Timeout performed at: 3/11/2024 7:27 AM  Peripheral Block  Patient position: sitting  Prep: ChloraPrep  Patient monitoring: continuous pulse ox  Block type: interscalene  Laterality: left  Injection technique: single-shot  Guidance: ultrasound guided  Local infiltration: lidocaine  Infiltration strength: 1 %  Dose: 1 mL  Needle  Needle gauge: 22 G  Needle length: 5 cm  Needle localization: ultrasound guidance     image stored in chart  Assessment  Injection assessment: negative aspiration for heme, no paresthesia on injection, incremental injection and local visualized surrounding nerve on ultrasound

## 2024-03-14 DIAGNOSIS — M75.122 COMPLETE TEAR OF LEFT ROTATOR CUFF, UNSPECIFIED WHETHER TRAUMATIC: ICD-10-CM

## 2024-03-14 DIAGNOSIS — M75.122 COMPLETE TEAR OF LEFT ROTATOR CUFF, UNSPECIFIED WHETHER TRAUMATIC: Primary | ICD-10-CM

## 2024-03-14 RX ORDER — DOCUSATE SODIUM 100 MG/1
100 CAPSULE, LIQUID FILLED ORAL 2 TIMES DAILY
Qty: 60 CAPSULE | Refills: 2 | Status: SHIPPED | OUTPATIENT
Start: 2024-03-14

## 2024-03-15 ENCOUNTER — APPOINTMENT (OUTPATIENT)
Dept: RADIOLOGY | Facility: CLINIC | Age: 59
End: 2024-03-15
Payer: MEDICAID

## 2024-03-15 RX ORDER — OXYCODONE AND ACETAMINOPHEN 5; 325 MG/1; MG/1
1 TABLET ORAL EVERY 6 HOURS PRN
Qty: 28 TABLET | Refills: 0 | Status: SHIPPED | OUTPATIENT
Start: 2024-03-15 | End: 2024-03-27 | Stop reason: SDUPTHER

## 2024-03-27 ENCOUNTER — OFFICE VISIT (OUTPATIENT)
Dept: ORTHOPEDIC SURGERY | Facility: CLINIC | Age: 59
End: 2024-03-27
Payer: MEDICAID

## 2024-03-27 DIAGNOSIS — M75.122 COMPLETE TEAR OF LEFT ROTATOR CUFF, UNSPECIFIED WHETHER TRAUMATIC: ICD-10-CM

## 2024-03-27 DIAGNOSIS — M75.102 TEAR OF LEFT ROTATOR CUFF, UNSPECIFIED TEAR EXTENT, UNSPECIFIED WHETHER TRAUMATIC: Primary | ICD-10-CM

## 2024-03-27 PROCEDURE — 99024 POSTOP FOLLOW-UP VISIT: CPT | Performed by: STUDENT IN AN ORGANIZED HEALTH CARE EDUCATION/TRAINING PROGRAM

## 2024-03-27 RX ORDER — OXYCODONE AND ACETAMINOPHEN 5; 325 MG/1; MG/1
1 TABLET ORAL EVERY 6 HOURS PRN
Qty: 28 TABLET | Refills: 0 | Status: SHIPPED | OUTPATIENT
Start: 2024-03-27 | End: 2024-04-03

## 2024-03-27 NOTE — PROGRESS NOTES
History: Patient returns to the office status post L RCR and BT on 2 weeks ago. Patient has been immobilized in a sling and pain is well controlled. Denies numbness/tingling.     Past medical history, past surgical history, medications, allergies, family history, social history, and review of systems were reviewed today and have been documented separately in this encounter.   A 12 point review of systems was negative other than as stated in the HPI.    Physical Examination:    On exam of the left upper extremity, incisions are well healed, without significant erythema or drainage, sutures were removed today. Demonstrates full ROM of the elbow/wrist/hand. Neurovascularly intact throughout.    Assessment: 2 weeks s/p L RCR and BT     Plan: At this point we will continue sling wear to allow the shoulder to heal. We will see them back in 3-4 weeks and begin PT at that time. All questions answered.     Dragon software was used to dictate this note, please be aware that minor errors in transcription may be present.    Nehemias Leach MD    Shoulder/Elbow Surgery  [facility]

## 2024-04-08 PROCEDURE — RXMED WILLOW AMBULATORY MEDICATION CHARGE

## 2024-04-12 ENCOUNTER — PHARMACY VISIT (OUTPATIENT)
Dept: PHARMACY | Facility: CLINIC | Age: 59
End: 2024-04-12
Payer: COMMERCIAL

## 2024-04-12 DIAGNOSIS — J43.9 PULMONARY EMPHYSEMA, UNSPECIFIED EMPHYSEMA TYPE (MULTI): ICD-10-CM

## 2024-04-12 DIAGNOSIS — M75.102 TEAR OF LEFT ROTATOR CUFF, UNSPECIFIED TEAR EXTENT, UNSPECIFIED WHETHER TRAUMATIC: Primary | ICD-10-CM

## 2024-04-12 RX ORDER — OXYCODONE HYDROCHLORIDE 5 MG/1
5 TABLET ORAL EVERY 6 HOURS PRN
Qty: 15 TABLET | Refills: 0 | Status: SHIPPED | OUTPATIENT
Start: 2024-04-12 | End: 2024-04-19

## 2024-04-12 RX ORDER — ALBUTEROL SULFATE 90 UG/1
1-2 AEROSOL, METERED RESPIRATORY (INHALATION) EVERY 6 HOURS PRN
Qty: 18 G | Refills: 3 | Status: SHIPPED | OUTPATIENT
Start: 2024-04-12

## 2024-04-17 ENCOUNTER — OFFICE VISIT (OUTPATIENT)
Dept: ORTHOPEDIC SURGERY | Facility: CLINIC | Age: 59
End: 2024-04-17
Payer: MEDICARE

## 2024-04-17 DIAGNOSIS — M75.102 TEAR OF LEFT ROTATOR CUFF, UNSPECIFIED TEAR EXTENT, UNSPECIFIED WHETHER TRAUMATIC: Primary | ICD-10-CM

## 2024-04-17 PROCEDURE — 99024 POSTOP FOLLOW-UP VISIT: CPT | Performed by: STUDENT IN AN ORGANIZED HEALTH CARE EDUCATION/TRAINING PROGRAM

## 2024-04-17 RX ORDER — OXYCODONE AND ACETAMINOPHEN 5; 325 MG/1; MG/1
1 TABLET ORAL EVERY 6 HOURS PRN
Qty: 25 TABLET | Refills: 0 | Status: SHIPPED | OUTPATIENT
Start: 2024-04-17 | End: 2024-04-24

## 2024-04-17 NOTE — PROGRESS NOTES
History: Patient returns to the office status post L RCR and BT on 5 weeks ago. Patient has been immobilized in a sling and pain is well controlled. Denies numbness/tingling.     Past medical history, past surgical history, medications, allergies, family history, social history, and review of systems were reviewed today and have been documented separately in this encounter.   A 12 point review of systems was negative other than as stated in the HPI.    Physical Examination:    On exam of the left upper extremity, incisions are well healed, without significant erythema or drainage. Demonstrates full ROM of the elbow/wrist/hand. Neurovascularly intact throughout. Passive ROM of the operative shoulder , ER 0.     Assessment: 5 weeks s/p L RCR and BT     Plan: At this point we will discontinue sling wear and start formal PT. PT script provided. Will progress from PROM to AAROM to AROM as tolerated, strengthening after ROM is full. We will see them back around 3 months postoperatively. All questions answered.     Dragon software was used to dictate this note, please be aware that minor errors in transcription may be present.    Nehemias Leach MD    Shoulder/Elbow Surgery  [facility]

## 2024-05-07 ENCOUNTER — EVALUATION (OUTPATIENT)
Dept: PHYSICAL THERAPY | Facility: CLINIC | Age: 59
End: 2024-05-07
Payer: MEDICARE

## 2024-05-07 DIAGNOSIS — M25.512 CHRONIC LEFT SHOULDER PAIN: Primary | ICD-10-CM

## 2024-05-07 DIAGNOSIS — M75.102 TEAR OF LEFT ROTATOR CUFF, UNSPECIFIED TEAR EXTENT, UNSPECIFIED WHETHER TRAUMATIC: ICD-10-CM

## 2024-05-07 DIAGNOSIS — G89.29 CHRONIC LEFT SHOULDER PAIN: Primary | ICD-10-CM

## 2024-05-07 PROCEDURE — 97161 PT EVAL LOW COMPLEX 20 MIN: CPT | Mod: GP

## 2024-05-07 PROCEDURE — 97110 THERAPEUTIC EXERCISES: CPT | Mod: GP

## 2024-05-07 ASSESSMENT — PAIN - FUNCTIONAL ASSESSMENT: PAIN_FUNCTIONAL_ASSESSMENT: 0-10

## 2024-05-07 ASSESSMENT — ENCOUNTER SYMPTOMS
LOSS OF SENSATION IN FEET: 0
DEPRESSION: 0
OCCASIONAL FEELINGS OF UNSTEADINESS: 0

## 2024-05-07 ASSESSMENT — PAIN SCALES - GENERAL: PAINLEVEL_OUTOF10: 7

## 2024-05-07 NOTE — PROGRESS NOTES
Physical Therapy  Physical Therapy Orthopedic Evaluation    Patient Name: Alber Reese  MRN: [id number]  Today's Date: 5/7/2024  Time Calculation  Start Time: 1312  Stop Time: 1343  Time Calculation (min): 31 min    Insurance:  Visit number: 1 of MN  Authorization info: NO  Insurance Type: Medicare    General:  Reason for visit: s/p large RTC: supra,infra, subscap 3/11/24 (8w1d) & biceps tenodesis  Referred by: Dr. Nehemias Leach    Current Problem  1. Chronic left shoulder pain  Follow Up In Physical Therapy      2. Tear of left rotator cuff, unspecified tear extent, unspecified whether traumatic  Referral to Physical Therapy    Follow Up In Physical Therapy          Precautions: Subscapularis Repair (12 weeks)   No ER past 30 degrees   No cross body adduction   No active IR or IR behind back   No supporting of body weight on affected  side (i.e. pushing up from chair)  Biceps Tenodesis   No extension or horizontal abduction for 4-6 weeks   No resisted elbow flexion, shoulder flexion, or supination for 6 weeks  Precautions  STEADI Fall Risk Score (The score of 4 or more indicates an increased risk of falling): 1    Medical History Form: Reviewed (scanned into chart)    Subjective:     Chief Complaint: Patient presents to clinic s/p L RTC- subscap;infra;supra & biceps tenodesis  Onset Date: surgery= 3/11/24; 5 years of shoulder  MYRON: Chronic and Overuse    Current Condition:   Better   Has been lifting things as heavy as a gallon of milk.  Fell off ladder 2 weeks ago onto surgical side.  Has been liberal with precautions  Pain:  Pain Assessment: 0-10  Pain Score: 7  Pain Type: Chronic pain, Surgical pain  Pain Location: Shoulder  Pain Orientation: Left  Location: L shoulder - biceps, posterior cuff, subscap  Description: stabbing, aching  Aggravating Factors: sleeping, regular aches and pains any movements  Relieving Factors:  Rest and Ice meds    Relevant Information (PMH & Previous Tests/Imaging): COPD, no previous  injury, chronic smoker-still smoking  Previous Interventions/Treatments: Injections    Prior Level of Function (PLOF)  Patient previously independent with all ADLs  Exercise/Physical Activity: working with hands  Work/School: retired, maintenance at apartments    Patients Living Environment: Reviewed and no concern    Primary Language: English    Patient's Goal(s) for Therapy: get L shoulder back to normal    Red Flags: Do you have any of the following? No  Fever/chills, unexplained weight changes, dizziness/fainting, unexplained change in bowel or bladder functions, unexplained malaise or muscle weakness, night pain/sweats, numbness or tingling    Objective:  Objective     Dermatomes/Myotomes    C4: Grossly Intact  C5: Grossly Intact  C6: Grossly Intact  C7: Grossly Intact  C8: Grossly Intact  T1: Grossly Intact    C4: 5/5  C5: 5/5  C6: 5/5  C7: 5/5  C8: 5/5  T1: 5/5            ROM    Cervical AROM (%)    Flexion: WNL  Extension: WNL  (L) Side Bend: 70  (R) Side Bend: 75  (L) Rotation: 70  (R) Rotation: 85      Shoulder AROM (Degrees)      (R)  (L)  Flexion: 160  75   Abduction: 160  NT     Functional ER: T3  NT     Functional IR: L2  NT         Shoulder PROM (Degrees)      (R)  (L)  Flexion: 170  105      Abduction: 170             NT    ER at 0:  60  15                Elbow AROM (Degrees)      (R)  (L)  Flexion: WNL  WNL      Extension: WNL  WNL         Elbow PROM (Degrees)      (R)  (L)  Flexion: WNL  WNL      Extension: WNL  WNL             Strength Testing    Shoulder    (R)  (L)  Flexion: 4+/5  2+/5      Abduction: 4+/5  NT/5     ER:  4/5  NT/5     IR:  5/5  NT/5         Elbow    (R)  (L)  Flexion: 5/5  3/5       Extension: 5/5  4-/5           Posture: fwd rounded shoulders, thoracic kyphosis, elevated scaps bilateral      Palpation: TTP along L subscap, supraspinatus, infraspinatus muscle and tendons, biceps tendon                  Outcome Measures:  Other Measures  Disability of Arm Shoulder Hand (DASH):  100 (100% disability)     EDUCATION: home exercise program, plan of care, activity modifications, pain management, and injury pathology       Goals: Set and discussed today  Active       PT Problem       PT Goal 1       Start:  24    Expected End:  24       STG 4 weeks:  Pt will be Independent with home exercise program for self management of symptoms.  Pt will achieve L shoulder flexion/scaption PROM @ 150deg or better in the affected extremity to progress towards OH tasks for improved ADL/IADL function in the home.             PT Goal 2       Start:  24    Expected End:  24       LT weeks or more  Pt will be able to independently don/doff clothing without increase in pain for improved ADL performance.  Pt will verbalize understanding of ergonomic office alignment to decrease postural stress on body and increase tolerance for work duties/study.  Pt will increase Quick DASH score functional outcome score by MCID points to demonstrate improved functional mobility for return to ADLs and IADLs with decreased pain.  Pt will return to age appropriate activity without increase pain or symptoms to allow pt to return to PLOF.  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               Plan of care was developed with input and agreement by the patient      Treatment Performed:    Codes:  Access Code: [email or web address]  URL: [email or web address]  Date: 2024  Prepared by: Felipe Prieto    Exercises  - Seated Scapular Retraction  - 1 x daily - 7 x weekly - 3 sets - 10 reps  - Circular Shoulder Pendulum with Table Support  - 1 x daily - 7 x weekly - 3 sets - 10 reps  - Seated Shoulder Flexion Towel Slide at Table  - 1 x daily - 7 x weekly - 3 sets - 10 reps  - Seated Elbow Flexion and Extension AROM  - 1 x daily - 7 x weekly - 3 sets - 10 reps  - Wrist Flexion AROM  - 1 x daily  - 7 x weekly - 3 sets - 10 reps  - Seated Shoulder External Rotation AAROM with Dowel  - 1 x daily - 7 x weekly - 3 sets - 10 reps  - Prone Scapular Retraction  - 1 x daily - 7 x weekly - 3 sets - 10 reps  - Isometric Shoulder External Rotation at Wall  - 1 x daily - 7 x weekly - 3 sets - 10 reps  - Shoulder Flexion Wall Walk  - 1 x daily - 7 x weekly - 3 sets - 10 reps      Assessment: Patient presents with signs and symptoms consistent with s/p L large RCR- subscap,supra, infra, with biceps tenodesis, resulting in limited participation in pain-free ADLs and inability to perform at their prior level of function. Pt would benefit from physical therapy to address the impairments found & listed previously in the objective section in order to return to safe and pain-free ADLs and prior level of function.     Clinical Presentation: Stable      Plan:  Rehab Potential: Fair (to poor (potential re-injury after fall off ladder))  Plan of Care Agreement: Patient  Planned Interventions include: therapeutic exercise, self-care home management, manual therapy, therapeutic activities, gait training, neuromuscular coordination, vasopneumatic, dry needling, aquatic therapy  Frequency: 2 x Week  Duration: 6 Months      Felipe Prieto PT, DPT #[id number]

## 2024-05-09 ENCOUNTER — APPOINTMENT (OUTPATIENT)
Dept: PHYSICAL THERAPY | Facility: CLINIC | Age: 59
End: 2024-05-09
Payer: MEDICARE

## 2024-05-14 ENCOUNTER — APPOINTMENT (OUTPATIENT)
Dept: PHYSICAL THERAPY | Facility: CLINIC | Age: 59
End: 2024-05-14
Payer: MEDICARE

## 2024-05-16 ENCOUNTER — DOCUMENTATION (OUTPATIENT)
Dept: PHYSICAL THERAPY | Facility: CLINIC | Age: 59
End: 2024-05-16
Payer: COMMERCIAL

## 2024-05-16 NOTE — PROGRESS NOTES
Physical Therapy                 Therapy Communication Note    Patient Name: Alber Reese  MRN: [id number]  Today's Date: 5/16/2024     Discipline: Physical Therapy    Missed Visit Reason:      Missed Time: No Show    Comment: s/p RCR cancelled first two follow ups after eval and no showed today. Pt needs to be sign since he is post-op but not following through with PT at this time.

## 2024-06-14 ENCOUNTER — TELEPHONE (OUTPATIENT)
Dept: RADIOLOGY | Facility: HOSPITAL | Age: 59
End: 2024-06-14
Payer: COMMERCIAL

## 2024-06-14 NOTE — TELEPHONE ENCOUNTER
Call placed to the patient to reach out to inquire about the interest in remaining in the program. Unable to leave a voicemail message. Patient had multiple no shows.

## 2024-06-17 NOTE — PROGRESS NOTES
History: Patient returns to the office status post L RCR and BT on 3/11/24. Patient has been working with therapy and pain is well controlled. Denies numbness/tingling.  Unfortunately, few weeks ago, he fell off a ladder onto his left shoulder.  He says that the shoulder is been painful since that time.    Past medical history, past surgical history, medications, allergies, family history, social history, and review of systems were reviewed today and have been documented separately in this encounter.   A 12 point review of systems was negative other than as stated in the HPI.    Physical Examination:    On exam of the left upper extremity, incisions are well healed, without significant erythema or drainage. Demonstrates full ROM of the elbow/wrist/hand. Neurovascularly intact throughout. AROM of the shoulder today to forward flexion to 160, external rotation of 40.  With rotator cuff strength testing bilaterally.    Assessment: 3 months s/p L RCR and BT     Plan: Patient progressing as expected after recent surgery.  He did have a setback, as he fell off of a ladder, but today he has good range of motion and strength of the shoulder.  I did state that it is possible that he really tore the tendon, but given his exam today as well as the fact that he is only 3 months from surgery I do not think that getting an MRI at this point would .  Continue to work with PT. We will see them in about 6 weeks for clinical check, if he continues have issues may consider getting MRI at that time.  Equal strength. All questions answered.     Dragon software was used to dictate this note, please be aware that minor errors in transcription may be present.    Nehemias Leach MD    Shoulder/Elbow Surgery  [facility] [place]

## 2024-06-18 ENCOUNTER — OFFICE VISIT (OUTPATIENT)
Dept: ORTHOPEDIC SURGERY | Facility: CLINIC | Age: 59
End: 2024-06-18
Payer: COMMERCIAL

## 2024-06-18 DIAGNOSIS — M75.122 COMPLETE TEAR OF LEFT ROTATOR CUFF, UNSPECIFIED WHETHER TRAUMATIC: Primary | ICD-10-CM

## 2024-06-18 PROCEDURE — 99213 OFFICE O/P EST LOW 20 MIN: CPT | Performed by: STUDENT IN AN ORGANIZED HEALTH CARE EDUCATION/TRAINING PROGRAM

## 2024-06-21 ENCOUNTER — HOSPITAL ENCOUNTER (OUTPATIENT)
Dept: RADIOLOGY | Facility: CLINIC | Age: 59
Discharge: HOME | End: 2024-06-21
Payer: MEDICARE

## 2024-06-21 DIAGNOSIS — R91.1 LUNG NODULE: ICD-10-CM

## 2024-06-21 DIAGNOSIS — J43.9 PULMONARY EMPHYSEMA, UNSPECIFIED EMPHYSEMA TYPE (MULTI): ICD-10-CM

## 2024-06-21 PROCEDURE — 3430000001 HC RX 343 DIAGNOSTIC RADIOPHARMACEUTICALS: Performed by: INTERNAL MEDICINE

## 2024-06-21 PROCEDURE — 78815 PET IMAGE W/CT SKULL-THIGH: CPT

## 2024-06-21 PROCEDURE — A9552 F18 FDG: HCPCS | Performed by: INTERNAL MEDICINE

## 2024-06-21 RX ORDER — FLUDEOXYGLUCOSE F 18 200 MCI/ML
13.3 INJECTION, SOLUTION INTRAVENOUS
Status: COMPLETED | OUTPATIENT
Start: 2024-06-21 | End: 2024-06-21

## 2024-06-24 RX ORDER — ALBUTEROL SULFATE 90 UG/1
1-2 AEROSOL, METERED RESPIRATORY (INHALATION) EVERY 6 HOURS PRN
Qty: 18 G | Refills: 3 | Status: SHIPPED | OUTPATIENT
Start: 2024-06-24

## 2024-07-24 ENCOUNTER — APPOINTMENT (OUTPATIENT)
Dept: ORTHOPEDIC SURGERY | Facility: CLINIC | Age: 59
End: 2024-07-24
Payer: COMMERCIAL

## 2024-07-26 ENCOUNTER — OFFICE VISIT (OUTPATIENT)
Dept: PULMONOLOGY | Facility: CLINIC | Age: 59
End: 2024-07-26
Payer: MEDICARE

## 2024-07-26 VITALS
BODY MASS INDEX: 19.37 KG/M2 | HEART RATE: 73 BPM | SYSTOLIC BLOOD PRESSURE: 158 MMHG | DIASTOLIC BLOOD PRESSURE: 89 MMHG | TEMPERATURE: 98.1 F | OXYGEN SATURATION: 96 % | WEIGHT: 120 LBS

## 2024-07-26 DIAGNOSIS — R06.09 DYSPNEA ON EXERTION: ICD-10-CM

## 2024-07-26 DIAGNOSIS — J43.9 PULMONARY EMPHYSEMA, UNSPECIFIED EMPHYSEMA TYPE (MULTI): ICD-10-CM

## 2024-07-26 DIAGNOSIS — R63.4 WEIGHT LOSS: ICD-10-CM

## 2024-07-26 DIAGNOSIS — R91.1 LUNG NODULE: ICD-10-CM

## 2024-07-26 DIAGNOSIS — G47.34 NOCTURNAL HYPOXIA: ICD-10-CM

## 2024-07-26 DIAGNOSIS — F17.219 CIGARETTE NICOTINE DEPENDENCE WITH NICOTINE-INDUCED DISORDER: ICD-10-CM

## 2024-07-26 DIAGNOSIS — J98.4 CAVITARY LESION OF LUNG: Primary | ICD-10-CM

## 2024-07-26 PROCEDURE — 99215 OFFICE O/P EST HI 40 MIN: CPT | Performed by: INTERNAL MEDICINE

## 2024-07-26 RX ORDER — TIOTROPIUM BROMIDE 18 UG/1
1 CAPSULE ORAL; RESPIRATORY (INHALATION)
Qty: 30 CAPSULE | Refills: 11 | Status: SHIPPED | OUTPATIENT
Start: 2024-07-26 | End: 2025-07-26

## 2024-07-26 RX ORDER — ALBUTEROL SULFATE 90 UG/1
1-2 AEROSOL, METERED RESPIRATORY (INHALATION) EVERY 6 HOURS PRN
Qty: 18 G | Refills: 11 | Status: SHIPPED | OUTPATIENT
Start: 2024-07-26 | End: 2025-07-26

## 2024-07-26 RX ORDER — BUDESONIDE AND FORMOTEROL FUMARATE DIHYDRATE 160; 4.5 UG/1; UG/1
2 AEROSOL RESPIRATORY (INHALATION)
Qty: 18 G | Refills: 11 | Status: SHIPPED | OUTPATIENT
Start: 2024-07-26 | End: 2025-07-26

## 2024-07-26 RX ORDER — MONTELUKAST SODIUM 10 MG/1
10 TABLET ORAL NIGHTLY
Qty: 30 TABLET | Refills: 11 | Status: SHIPPED | OUTPATIENT
Start: 2024-07-26 | End: 2025-07-26

## 2024-07-26 RX ORDER — MICONAZOLE NITRATE 2 %
2 CREAM (GRAM) TOPICAL AS NEEDED
Qty: 100 EACH | Refills: 0 | Status: SHIPPED | OUTPATIENT
Start: 2024-07-26 | End: 2024-08-25

## 2024-07-26 ASSESSMENT — ENCOUNTER SYMPTOMS
EYE PAIN: 0
SINUS PAIN: 0
CONSTIPATION: 0
LIGHT-HEADEDNESS: 1
WOUND: 0
COUGH: 1
WHEEZING: 1
HEADACHES: 0
NERVOUS/ANXIOUS: 0
DEPRESSION: 0
CHEST TIGHTNESS: 1
RHINORRHEA: 0
ARTHRALGIAS: 1
SHORTNESS OF BREATH: 1
EYE REDNESS: 0
SORE THROAT: 0
DIARRHEA: 0
HEMATURIA: 0
PALPITATIONS: 0
DYSPHORIC MOOD: 0
FREQUENCY: 0
CONFUSION: 0
NECK PAIN: 1
APPETITE CHANGE: 0
FEVER: 0
BACK PAIN: 1
DYSURIA: 0
CHILLS: 0
SINUS PRESSURE: 0
DIZZINESS: 1
ABDOMINAL PAIN: 0
VOMITING: 0
NAUSEA: 0
MYALGIAS: 1
EYE ITCHING: 0
UNEXPECTED WEIGHT CHANGE: 1
SEIZURES: 0
FATIGUE: 1

## 2024-07-26 ASSESSMENT — PAIN SCALES - GENERAL: PAINLEVEL: 6

## 2024-07-26 NOTE — PROGRESS NOTES
Subjective   58 YOM with h/o hernia, pulmonary emphysema/blebs s/p resection in 2000, now is being seen for lung nodules.   Patient had not seen a doctor in ten years until he saw Dr. Barboza. Had screening chest CT done in Sep 2020 that showed a ? lung nodules, subsequently had PET done that showed mild activity, for that is referred to my office. Patient subsequently had more work up done as detailed below.   Last seen 6 months ago, no major events since then. Had PET/CT done.   Patient had nocturnal pulse oximetry done, qualified for nocturnal O2 therapy. Using it every night, with improvement of his symptoms.   Currently on Symbicort and albuterol. Continues to use his albuterol on several times a day.   Overall is feeling worse than  the last visit.   CC SOB, both at rest and with activity, but mostly activity. LEDEZMA after walking 20 feet without oxygen or climbing 5-10 steps. +ve orthopnea. +ve PND, several times every night. No LE edema. LEDEZMA started 2019, gradual onset, progressive. Associated with chest tightness and wheezing. LEDEZMA worse since the last visit.   +ve wheezing, several times a day. States wheezing improves with albuterol.  Wheezing stable since the last visit.   +ve chronic cough, mild to moderate, mostly in the morning, rarely productive of yellow/green sputum, no h/o hemoptysis. Cough and sputum for years now stable since  the last visit.  Having problem falling sleep and staying sleep. +ve snoring, Does not feel rested in am.   Exposures: quit smoking 7/2021, but started again, then quit again 12/2023. Now smokes sporadically. Around 40-60 pack/year h/o smoking. 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   Review of Systems   Constitutional:  Positive for fatigue and unexpected weight change. Negative for appetite change, chills and fever.        +ve weight loss.    HENT:  Positive for congestion. Negative for ear pain, postnasal drip, rhinorrhea, sinus pressure, sinus pain and sore  throat.    Eyes:  Negative for pain, redness, itching and visual disturbance.   Respiratory:  Positive for cough, chest tightness, shortness of breath and wheezing.    Cardiovascular:  Positive for chest pain. Negative for palpitations and leg swelling.   Gastrointestinal:  Negative for abdominal pain, constipation, diarrhea, nausea and vomiting.   Genitourinary:  Negative for dysuria, frequency and hematuria.   Musculoskeletal:  Positive for arthralgias, back pain, myalgias and neck pain.   Skin:  Negative for pallor, rash and wound.   Neurological:  Positive for dizziness and light-headedness. Negative for seizures, syncope and headaches.   Psychiatric/Behavioral:  Negative for confusion and dysphoric mood. The patient is not nervous/anxious.       Current Outpatient Medications   Medication Instructions    acetaminophen (TYLENOL) 500 mg, oral, Every 6 hours PRN    albuterol 2.5 mg, nebulization, 4 times daily PRN    budesonide-formoteroL (Symbicort) 160-4.5 mcg/actuation inhaler 2 puffs, inhalation, 2 times daily RT, RINSE MOUTH AFTER USE. GO TO EMERGENCY ROOM IF CHEST PAIN OR PALPITATIONS OR FEELING WORSE OR CON    diazePAM (VALIUM) 2 mg, oral, Daily PRN    docusate sodium (COLACE) 100 mg, oral, 2 times daily     mg tablet 1 tablet, oral, 3 times daily (morning, midday, late afternoon), prn    montelukast (SINGULAIR) 10 mg, oral, Nightly, STOP IF MOOD CHANGES.    nitroglycerin (Nitrostat) 0.4 mg SL tablet DISSOLVE ONE TABLET UNDER TONGUE every 5 minutes for up to 3 doses as needed for chest pain. call 911 if pain persists.<BR>    Spiriva with HandiHaler 18 mcg inhalation capsule 1 capsule, inhalation, Daily RT, GO TO ER IF VISUAL/EYE PAIN OR URINARY RETENTION OR PALPITATIONS OR CHEST PAIN OR    Ventolin HFA 90 mcg/actuation inhaler 1-2 puffs, inhalation, Every 6 hours PRN, EVERY 4 TO 6 HOURS AS NEEDED      Objective   Visit Vitals  /89   Pulse 73   Temp 36.7 °C (98.1 °F)      Physical  Exam  Constitutional:       Appearance: Normal appearance.      Comments: Thin.    HENT:      Head: Normocephalic and atraumatic.      Nose:      Comments: On RA.      Mouth/Throat:      Mouth: Mucous membranes are moist.      Comments: Mallampati 2-3  Eyes:      Extraocular Movements: Extraocular movements intact.      Pupils: Pupils are equal, round, and reactive to light.   Cardiovascular:      Rate and Rhythm: Normal rate and regular rhythm.      Heart sounds: Normal heart sounds. No murmur heard.     No friction rub. No gallop.   Pulmonary:      Effort: Pulmonary effort is normal. No respiratory distress.      Breath sounds: No wheezing or rales.      Comments: Clear lung fields b/l with fair air entry.   Abdominal:      General: There is no distension.      Palpations: Abdomen is soft.      Tenderness: There is no abdominal tenderness. There is no guarding.   Musculoskeletal:         General: No swelling or tenderness. Normal range of motion.      Cervical back: Normal range of motion and neck supple. No rigidity or tenderness.      Right lower leg: No edema.      Left lower leg: No edema.   Lymphadenopathy:      Cervical: No cervical adenopathy.   Skin:     General: Skin is warm and dry.      Coloration: Skin is not jaundiced.   Neurological:      General: No focal deficit present.      Mental Status: He is alert and oriented to person, place, and time.      Cranial Nerves: No cranial nerve deficit.      Motor: No weakness.   Psychiatric:         Mood and Affect: Mood normal.         Behavior: Behavior normal.     Relevant Results  I personally reviewed the images for the PET/CT from 6/2024 that showed:       Redemonstration of the thick-walled cavities of the right upper lobe exhibiting overall mild metabolic activity with more focal areas of further mild increased metabolic activity along its superolateral aspect and inferolateral aspect. Overall, findings are decreased in comparison the prior PET study.  Findings are again suggestive of an inflammatory/infectious process. Malignant involvement is felt to be less likely, but cannot be definitively excluded. If there is a high clinical suspicion for a neoplastic process, tissue sampling may be of value with attention to either the superolateral aspect or inferior medial aspect. No other definite areas of abnormal hypermetabolic tracer activity to  suggest an active neoplastic/metastatic process are identified.    The followings were reviewed during previous visits      I personally reviewed the images for the chest CT from 2023  that showed:  some progression of wall thickening at probable blebs and bulla at the right upper lung, likely due to infection such as aspergillosis or fungus. A cavitary malignant lesion is not excluded.  I personally reviewed the images for the following studies (official reads as below):    CT from 4/10/2023 that showed: interval decrease in RUL cavitary lesion   CT from 3/2023 that showed: interval decrease in the RUL cavitary lesion and subpleural mass like lesion   CT from 2022 that showed worsening of the RUL cavitary lesion   6 MWT from 2022 reviewed, walked 340/477 (55.2%), no desaturation on RA.   I personally interpreted the PFT from 2022 that showed: FEV1/FVC 39%, FEV1 40-->47% (19%) with bronchodilator response, TLC 87%, RV/%, DLCO 56-->70%.   Stress test in 10/2022 negative.  Sleep study result from 2022 reviewed that showed RDI 3% 18.4%, RDI 4% 9.4%, emelia O2 80s.  Assessment/Plan   58 YOM with h/o bullectomy, smoking, now is being seen for LEDEZMA and lung mass/nodule.      1. Lung mass/nodule: ? scar. Bullectomy both lungs in . 2.9 x 1.7 cm right apex mass/speculated and an 8 mm RUL nodule, both only mild uptake on PET. Patient is high risk given h/o smoking and recent h/o weight loss. Also father  of lung cancer. CT chest done, that showed stable nodule and likely scar tissue. Repeat chest CT in December  2021 showed enlarging RUL nodule. PET increased uptake, s/p bronch + biopsy that showed chronic inflammation with occasional not necrotizing granuloma. Patient had repeat chest CT done in Sep 2022 that showed increased in size of the cavity. Subsequently was referred to thoracic surgery who ordered a CT guided biopsy which was done in 11/2022. Cultures +ve for pseudomonas, but otherwise negative. Cytology negative. Pathology showed dense fibroadipose tissue with chronic inflammation. Was treated with Levaquin for 2 weeks. Repeat CTs in March and April 2023 showed continuous improvement. However CT in 11/2023 showed worsening RUL thickening of bullae concerning for infection and malignancy. Especially since he has lost weight and has worsening symptoms.   PET/CT of the chest done, overall low suspicious for malignancy.    Sputum for AFB, fungal and bacterial cultures     2. COPD: PFT showed moderate COPD. Significant air trapping, marked LEDEZMA, on LAMA + LABA + ICS and rescue inhaler. Rescue inhaler need is now very frequent. Symptoms stable since the last visit. CAT today 28. Category B. Started on Anoro previously, but could not afford due to high Co-payment. Now back on Symbicort + Spiriva (ran out of it)        Continue Symbicort, Spiriva and Singulair   continue Ventolin as needed.    6 MWT done, no desaturation   nocturnal pulse O2 done, qualified for nocturnal oxygen, advised to continue to wear   pulmonary rehab referral done, has not attended yet.      3. Smoking: quit smoking July 2021, but now started again. Tried nicotine patch without any effect. Quit again 12/2023   started on nicotine gums, will continue      4. LEDEZMA: likely from COPD, had cardiac work up done.    2D echocardiogram done, only showed dilated LA   stress test in 10/2022 also negative.      5. Weight loss: ? cause, pulmonary work up negative for cancer. Now overall stable.    advised to discuss this with his primary care. Has not talked to  them yet.      RTC in 3 months with the result of the sputum cultures.      Sully Carlin MD

## 2024-07-26 NOTE — PATIENT INSTRUCTIONS
Mr. Crawley,     It was a pleasure to talk to you today. We discussed the followings:     1. Lung mass/nodule: given it has minimal activity on PET scan and it was stable in size in your repeat chest CT for a while. However your repeat chest CT in 9/2022 showed enlarging nodule. You subsequently had a biopsy done that did not show any cancer. Your PET CT scan showed it is stable. To further evaluate please collect your sputum and take it to the lab.     2. COPD: your breathing test showed that you have moderate COPD. Given your symptoms are not well controlled, please make sure you will attend the pulmonary rehab. Your six minute walk test result showed that you did not qualify for oxygen supplementation. Please continue Symbicort, Spiriva, Montelukast and Ventolin as needed.      3. Smoking: Please make every effort to quit smoking. I am re-starting you on Nicotine gum.      4. Chest pain: please make sure you are following up with a heart doctor.     5. Low oxygen level: please continue to wear your supplemental oxygen at nights.      6. Weight loss: discuss this with your primary care.     Please return to the clinic in 3 months with the result of the above tests.

## 2024-08-06 ENCOUNTER — OFFICE VISIT (OUTPATIENT)
Dept: ORTHOPEDIC SURGERY | Facility: CLINIC | Age: 59
End: 2024-08-06
Payer: MEDICARE

## 2024-08-06 DIAGNOSIS — M75.122 COMPLETE TEAR OF LEFT ROTATOR CUFF, UNSPECIFIED WHETHER TRAUMATIC: Primary | ICD-10-CM

## 2024-08-06 PROCEDURE — 99212 OFFICE O/P EST SF 10 MIN: CPT | Performed by: STUDENT IN AN ORGANIZED HEALTH CARE EDUCATION/TRAINING PROGRAM

## 2024-08-06 ASSESSMENT — PAIN - FUNCTIONAL ASSESSMENT: PAIN_FUNCTIONAL_ASSESSMENT: NO/DENIES PAIN

## 2024-08-06 NOTE — PROGRESS NOTES
History: Patient returns to the office status post L RCR and BT on 3/11/24. Patient has been working with therapy and pain is well controlled. Denies numbness/tingling.  Patient fell about 2 months ago, now around 5months from surgery.     Past medical history, past surgical history, medications, allergies, family history, social history, and review of systems were reviewed today and have been documented separately in this encounter.   A 12 point review of systems was negative other than as stated in the HPI.    Physical Examination:    On exam of the left upper extremity, incisions are well healed, without significant erythema or drainage. Demonstrates full ROM of the elbow/wrist/hand. Neurovascularly intact throughout. AROM of the shoulder today to forward flexion to 160, external rotation of 40.  With rotator cuff strength testing bilaterally.    Assessment: 5 months s/p L RCR and BT     Plan: Patient progressing as expected after recent surgery.  He is doing better than when I last saw him.  He still has some pain in the left shoulder but it is definitely improved.  At this point I think it is reasonable to continue watching the left shoulder as opposed to getting repeat MRI.  Will have him follow-up in 6 weeks for another clinical check.    Dragon software was used to dictate this note, please be aware that minor errors in transcription may be present.    Nehemias Leach MD    Shoulder/Elbow Surgery  [facility] [place]

## 2024-08-16 DIAGNOSIS — J43.9 PULMONARY EMPHYSEMA, UNSPECIFIED EMPHYSEMA TYPE (MULTI): Primary | ICD-10-CM

## 2024-08-16 RX ORDER — FLUTICASONE PROPIONATE AND SALMETEROL 500; 50 UG/1; UG/1
1 POWDER RESPIRATORY (INHALATION)
Qty: 60 EACH | Refills: 11 | Status: SHIPPED | OUTPATIENT
Start: 2024-08-16 | End: 2025-08-16

## 2024-08-28 PROCEDURE — RXMED WILLOW AMBULATORY MEDICATION CHARGE

## 2024-08-30 ENCOUNTER — PHARMACY VISIT (OUTPATIENT)
Dept: PHARMACY | Facility: CLINIC | Age: 59
End: 2024-08-30
Payer: COMMERCIAL

## 2024-09-17 ENCOUNTER — HOSPITAL ENCOUNTER (OUTPATIENT)
Dept: RADIOLOGY | Facility: CLINIC | Age: 59
Discharge: HOME | End: 2024-09-17
Payer: MEDICARE

## 2024-09-17 ENCOUNTER — OFFICE VISIT (OUTPATIENT)
Dept: ORTHOPEDIC SURGERY | Facility: CLINIC | Age: 59
End: 2024-09-17
Payer: MEDICARE

## 2024-09-17 DIAGNOSIS — M75.122 COMPLETE TEAR OF LEFT ROTATOR CUFF, UNSPECIFIED WHETHER TRAUMATIC: Primary | ICD-10-CM

## 2024-09-17 DIAGNOSIS — M75.82 ROTATOR CUFF TENDINITIS, LEFT: ICD-10-CM

## 2024-09-17 DIAGNOSIS — Z98.890 HISTORY OF REPAIR OF LEFT ROTATOR CUFF: ICD-10-CM

## 2024-09-17 PROCEDURE — 99213 OFFICE O/P EST LOW 20 MIN: CPT | Performed by: STUDENT IN AN ORGANIZED HEALTH CARE EDUCATION/TRAINING PROGRAM

## 2024-09-17 PROCEDURE — 73030 X-RAY EXAM OF SHOULDER: CPT | Mod: LT

## 2024-09-17 PROCEDURE — 73030 X-RAY EXAM OF SHOULDER: CPT | Mod: LEFT SIDE | Performed by: RADIOLOGY

## 2024-09-17 RX ORDER — LIDOCAINE 50 MG/G
1 PATCH TOPICAL DAILY
Qty: 20 PATCH | Refills: 2 | Status: SHIPPED | OUTPATIENT
Start: 2024-09-17 | End: 2024-10-17

## 2024-09-17 RX ORDER — ACETAMINOPHEN 500 MG
500 TABLET ORAL EVERY 6 HOURS PRN
Qty: 30 TABLET | Refills: 2 | Status: SHIPPED | OUTPATIENT
Start: 2024-09-17

## 2024-09-17 ASSESSMENT — PAIN - FUNCTIONAL ASSESSMENT: PAIN_FUNCTIONAL_ASSESSMENT: 0-10

## 2024-09-17 ASSESSMENT — PAIN SCALES - GENERAL: PAINLEVEL_OUTOF10: 8

## 2024-09-17 NOTE — PROGRESS NOTES
CHIEF COMPLAINT:   Chief Complaint   Patient presents with    Left Shoulder - Follow-up     History: 58 y.o. male presents to the office today for evaluation of his left shoulder.  He under went a left rotator cuff and biceps tenodesis with me about 6 months ago.  Unfortunately, about 3 months ago, he fell.  We will concern for possible reinjury of the rotator cuff at that time.  He did initially get better, but now is in significant pain again today in the left shoulder.  Pains are primarily anterior.  Has finished physical therapy.  Difficulty with daily activities.    Past medical history, past surgical history, medications, allergies, family history, social history, and review of systems were reviewed today.    A 12 point review of systems was negative other than as stated in the HPI.    Past Medical History:   Diagnosis Date    Bleb, lung (Multi)     s/p resection in 2000    Cigarette smoker     COPD (chronic obstructive pulmonary disease) (Multi)     Emphysema lung (Multi)     H/O alcohol abuse     Lung nodule     Nocturnal hypoxemia     Wears O2 QHS    Other specified health status 08/27/2020    No pertinent past medical history    Requires continuous at home supplemental oxygen     2.5 L QHS    Sleep-disordered breathing         No Known Allergies     Past Surgical History:   Procedure Laterality Date    BRONCHOSCOPY  09/2022    CARDIOVASCULAR STRESS TEST  10/14/2022    IMPRESSION: 1. There is no evidence of ischemia or prior infarction. 2. The left ventricle is normal in size. 3. Normal LV wall motion with stress LV EF estimated at 52%.    CT GUIDED PERCUTANEOUS BIOPSY LUNG  11/10/2022    CT GUIDED PERCUTANEOUS BIOPSY LUNG 11/10/2022 [facility]    ECHOCARDIOGRAM 2 D M MODE PANEL  05/27/2021    CONCLUSIONS:  1. The left ventricular systolic function is normal with a 55-60% estimated ejection fraction.  2. The left atrium is moderate to severely dilated.    HERNIA REPAIR      KNEE SURGERY Right 1994     LUNG REMOVAL, PARTIAL      blebs s/p resection        Family History   Problem Relation Name Age of Onset    Lung cancer Father      No Known Problems Sister      No Known Problems Brother          Social History     Socioeconomic History    Marital status:      Spouse name: Not on file    Number of children: Not on file    Years of education: Not on file    Highest education level: Not on file   Occupational History    Not on file   Tobacco Use    Smoking status: Some Days     Current packs/day: 1.00     Average packs/day: 1 pack/day for 40.0 years (40.0 ttl pk-yrs)     Types: Cigarettes    Smokeless tobacco: Never    Tobacco comments:     10 cigs/day trying to quit    Vaping Use    Vaping status: Never Used   Substance and Sexual Activity    Alcohol use: Yes     Alcohol/week: 6.0 standard drinks of alcohol     Types: 6 Cans of beer per week    Drug use: Not Currently    Sexual activity: Defer   Other Topics Concern    Not on file   Social History Narrative    Not on file     Social Determinants of Health     Financial Resource Strain: Not on File (3/29/2022)    Received from [facility]    Financial Resource Strain     Financial Resource Strain: 0   Food Insecurity: Not on file (2024)   Transportation Needs: Not on File (3/29/2022)    Received from [facility]    Transportation Needs     Transportation: 0   Physical Activity: Not on File (3/29/2022)    Received from [facility]    Physical Activity     Physical Activity: 0   Stress: Not on File (3/29/2022)    Received from [facility]    Stress     Stress: 0   Social Connections: Not on File (3/29/2022)    Received from [facility]    Social Connections     Social Connections and Isolation: 0   Intimate Partner Violence: Not on file   Housing Stability: Not on File (3/29/2022)    Received from [facility]    Housing Stability     Housin        CURRENT MEDICATIONS:   Current Outpatient Medications   Medication Sig Dispense Refill     acetaminophen (Tylenol) 500 mg tablet Take 1 tablet (500 mg) by mouth every 6 hours if needed for mild pain (1 - 3). 30 tablet 2    albuterol 2.5 mg /3 mL (0.083 %) nebulizer solution Take 3 mL (2.5 mg) by nebulization 4 times a day as needed for wheezing or shortness of breath. 75 mL 3    diazePAM (Valium) 2 mg tablet Take 1 tablet (2 mg) by mouth once daily as needed.      docusate sodium (Colace) 100 mg capsule Take 1 capsule (100 mg) by mouth 2 times a day. 60 capsule 2    fluticasone propion-salmeteroL (Advair Diskus) 500-50 mcg/dose diskus inhaler Inhale 1 puff 2 times a day. Rinse mouth with water after use to reduce aftertaste and incidence of candidiasis. Do not swallow. 60 each 11     mg tablet Take 1 tablet (800 mg) by mouth 3 times daily (morning, midday, late afternoon). prn      lidocaine (Lidoderm) 5 % patch Place 1 patch over 12 hours on the skin once daily. Remove & discard patch within 12 hours or as directed by MD. 20 patch 2    montelukast (Singulair) 10 mg tablet Take 1 tablet (10 mg) by mouth once daily at bedtime. STOP IF MOOD CHANGES. 30 tablet 11    nicotine polacrilex (Nicorette) 2 mg gum Chew 1 each (2 mg) if needed for smoking cessation. 100 each 0    nitroglycerin (Nitrostat) 0.4 mg SL tablet DISSOLVE ONE TABLET UNDER TONGUE every 5 minutes for up to 3 doses as needed for chest pain. call 911 if pain persists.      Spiriva with HandiHaler 18 mcg inhalation capsule Place 1 capsule (18 mcg) into inhaler and inhale once daily. GO TO ER IF VISUAL/EYE PAIN OR URINARY RETENTION OR PALPITATIONS OR CHEST PAIN OR 30 capsule 11    Ventolin HFA 90 mcg/actuation inhaler Inhale 1-2 puffs every 6 hours if needed for wheezing or shortness of breath. EVERY 4 TO 6 HOURS AS NEEDED 18 g 11     No current facility-administered medications for this visit.       Physical Examination:      3/11/2024     9:57 AM 3/11/2024    10:00 AM 3/11/2024    10:15 AM 3/11/2024    10:30 AM 3/11/2024    10:44 AM  3/11/2024    10:45 AM 7/26/2024     9:38 AM   Vitals   Systolic 154 145 141 132 134 134 158   Diastolic 92 96 85 79 79 79 89   Heart Rate 78 69  72 78 74 73   Temp 36.2 °C (97.2 °F) 36.2 °C (97.2 °F)   36.3 °C (97.3 °F) 36.3 °C (97.3 °F) 36.7 °C (98.1 °F)   Resp 18 15 16 19 17 16    Weight (lb)       120   BMI       19.37 kg/m2   BSA (m2)       1.59 m2   Visit Report       Report      There is no height or weight on file to calculate BMI.    Well-appearing, appears stated age, pleasant and cooperative, appropriate mood and behavior. Height and weight reviewed. Alert and oriented x3.  Auditory function intact.  No acute distress.  Intact ocular function, ALBERT, EOMI. Breathing is unlabored .  There is no evidence of jugular venous distension. Skin appearance is normal without evidence of rash or other lesions. 2+ radial pulses bilaterally, fingers pink and wwp, good capillary refill, no pitting edema. No appreciable lymphadenopathy in bilateral upper extremities. SILT throughout both upper extremities, median/radial/ulnar/musculocutaneous/axillary nerve motor and sensory intact (except for abnormalities noted in focused musculoskeletal exam section below).     Neck exam: Full range of motion of the neck in flexion/extension and rotational movements. No significant areas of tenderness to palpation in the neck.    On exam of bilateral upper extremities, active forward flexion of the left to 90, external Tatian of 40, internal rotation to the side.  Rotator cuff strength is diminished on the left, 4 out of 5 supraspinatus testing on the left.  Passively, I can achieve full range of motion but is very painful in the left side.    Imaging: Radiographs of the left shoulder were performed today.  Person interpreted by myself.  Preserved glenohumeral joint space.    Assessment: 6 months s/p surgery    Plan: Unfortunately, the patient continues to have significant pain and weakness in the left shoulder after an arthroscopic  rotator cuff repair and biceps tenodesis 6 months ago.  The patient was doing well until he fell 3 months ago.  At this point, I am concerned for retear of his repair.  I do think is reasonable to get a MRI of the left shoulder at this point.  Will also give him some Tylenol and lidocaine patches for the pain.  Will see him back in a few weeks to review the MRI and make a treatment plan.    Dragon software was used to dictate this note, please be aware that minor errors in transcription may be present.    Nehemias Leach MD    Shoulder/Elbow Surgery  [facility] [place]

## 2024-09-19 ENCOUNTER — HOSPITAL ENCOUNTER (OUTPATIENT)
Dept: RADIOLOGY | Facility: HOSPITAL | Age: 59
Discharge: HOME | End: 2024-09-19
Payer: MEDICARE

## 2024-09-19 DIAGNOSIS — M75.122 COMPLETE TEAR OF LEFT ROTATOR CUFF, UNSPECIFIED WHETHER TRAUMATIC: ICD-10-CM

## 2024-09-19 DIAGNOSIS — Z98.890 HISTORY OF REPAIR OF LEFT ROTATOR CUFF: ICD-10-CM

## 2024-09-19 PROCEDURE — 73221 MRI JOINT UPR EXTREM W/O DYE: CPT | Mod: LT

## 2024-10-09 ENCOUNTER — OFFICE VISIT (OUTPATIENT)
Dept: ORTHOPEDIC SURGERY | Facility: CLINIC | Age: 59
End: 2024-10-09
Payer: COMMERCIAL

## 2024-10-09 ENCOUNTER — PREP FOR PROCEDURE (OUTPATIENT)
Dept: ORTHOPEDICS | Facility: HOSPITAL | Age: 59
End: 2024-10-09

## 2024-10-09 DIAGNOSIS — S46.012D TRAUMATIC COMPLETE TEAR OF LEFT ROTATOR CUFF, SUBSEQUENT ENCOUNTER: ICD-10-CM

## 2024-10-09 DIAGNOSIS — Z98.890 HISTORY OF FAILED REPAIR OF ROTATOR CUFF: Primary | ICD-10-CM

## 2024-10-09 DIAGNOSIS — Z98.890 HISTORY OF REPAIR OF LEFT ROTATOR CUFF: Primary | ICD-10-CM

## 2024-10-09 DIAGNOSIS — M75.122 COMPLETE TEAR OF LEFT ROTATOR CUFF, UNSPECIFIED WHETHER TRAUMATIC: ICD-10-CM

## 2024-10-09 PROCEDURE — 99214 OFFICE O/P EST MOD 30 MIN: CPT | Performed by: STUDENT IN AN ORGANIZED HEALTH CARE EDUCATION/TRAINING PROGRAM

## 2024-10-09 PROCEDURE — L3670 SO ACRO/CLAV CAN WEB PRE OTS: HCPCS | Performed by: STUDENT IN AN ORGANIZED HEALTH CARE EDUCATION/TRAINING PROGRAM

## 2024-10-09 RX ORDER — SODIUM CHLORIDE, SODIUM LACTATE, POTASSIUM CHLORIDE, CALCIUM CHLORIDE 600; 310; 30; 20 MG/100ML; MG/100ML; MG/100ML; MG/100ML
20 INJECTION, SOLUTION INTRAVENOUS CONTINUOUS
OUTPATIENT
Start: 2024-10-09

## 2024-10-09 RX ORDER — ACETAMINOPHEN 500 MG
500 TABLET ORAL EVERY 6 HOURS PRN
Qty: 30 TABLET | Refills: 2 | Status: SHIPPED | OUTPATIENT
Start: 2024-10-09

## 2024-10-09 NOTE — PROGRESS NOTES
CHIEF COMPLAINT:   No chief complaint on file.    History: 59 y.o. male returns to clinic today for MRI followup after prior rotator cuff repair with persistent pain after new injury.  Just to review, he underwent surgery with me about 7 months ago.  He was progressing well, but unfortunately about 4 months ago he fell off a ladder.  Since that time we have attempted extensive physical therapy but the left shoulder still remains very painful.  We got an MRI and is here today to reevaluate that.    9/17/24: presents to the office today for evaluation of his left shoulder.  He under went a left rotator cuff and biceps tenodesis with me about 6 months ago.  Unfortunately, about 3 months ago, he fell.  We will concern for possible reinjury of the rotator cuff at that time.  He did initially get better, but now is in significant pain again today in the left shoulder.  Pains are primarily anterior.  Has finished physical therapy.  Difficulty with daily activities.    Past medical history, past surgical history, medications, allergies, family history, social history, and review of systems were reviewed today.    A 12 point review of systems was negative other than as stated in the HPI.    Past Medical History:   Diagnosis Date    Bleb, lung (Multi)     s/p resection in 2000    Cigarette smoker     COPD (chronic obstructive pulmonary disease) (Multi)     Emphysema lung (Multi)     H/O alcohol abuse     Lung nodule     Nocturnal hypoxemia     Wears O2 QHS    Other specified health status 08/27/2020    No pertinent past medical history    Requires continuous at home supplemental oxygen     2.5 L QHS    Sleep-disordered breathing         No Known Allergies     Past Surgical History:   Procedure Laterality Date    BRONCHOSCOPY  09/2022    CARDIOVASCULAR STRESS TEST  10/14/2022    IMPRESSION: 1. There is no evidence of ischemia or prior infarction. 2. The left ventricle is normal in size. 3. Normal LV wall motion with stress LV  EF estimated at 52%.    CT GUIDED PERCUTANEOUS BIOPSY LUNG  11/10/2022    CT GUIDED PERCUTANEOUS BIOPSY LUNG 11/10/2022 [facility]    ECHOCARDIOGRAM 2 D M MODE PANEL  05/27/2021    CONCLUSIONS:  1. The left ventricular systolic function is normal with a 55-60% estimated ejection fraction.  2. The left atrium is moderate to severely dilated.    HERNIA REPAIR      KNEE SURGERY Right 1994    LUNG REMOVAL, PARTIAL  2000    blebs s/p resection        Family History   Problem Relation Name Age of Onset    Lung cancer Father      No Known Problems Sister      No Known Problems Brother          Social History     Socioeconomic History    Marital status:      Spouse name: Not on file    Number of children: Not on file    Years of education: Not on file    Highest education level: Not on file   Occupational History    Not on file   Tobacco Use    Smoking status: Some Days     Current packs/day: 1.00     Average packs/day: 1 pack/day for 40.0 years (40.0 ttl pk-yrs)     Types: Cigarettes    Smokeless tobacco: Never    Tobacco comments:     10 cigs/day trying to quit    Vaping Use    Vaping status: Never Used   Substance and Sexual Activity    Alcohol use: Yes     Alcohol/week: 6.0 standard drinks of alcohol     Types: 6 Cans of beer per week    Drug use: Not Currently    Sexual activity: Defer   Other Topics Concern    Not on file   Social History Narrative    Not on file     Social Determinants of Health     Financial Resource Strain: Not on File (3/29/2022)    Received from [facility]    Financial Resource Strain     Financial Resource Strain: 0   Food Insecurity: Not on file (9/7/2024)   Transportation Needs: Not on File (3/29/2022)    Received from [facility]    Transportation Needs     Transportation: 0   Physical Activity: Not on File (3/29/2022)    Received from [facility]    Physical Activity     Physical Activity: 0   Stress: Not on File (3/29/2022)    Received from [facility]    Stress     Stress:  0   Social Connections: Not on File (2024)    Received from [facility]    Social Connections     Connectedness: 0   Intimate Partner Violence: Not on file   Housing Stability: Not on File (3/29/2022)    Received from [facility]    Housing Stability     Housin        CURRENT MEDICATIONS:   Current Outpatient Medications   Medication Sig Dispense Refill    acetaminophen (Tylenol) 500 mg tablet Take 1 tablet (500 mg) by mouth every 6 hours if needed for mild pain (1 - 3). 30 tablet 2    albuterol 2.5 mg /3 mL (0.083 %) nebulizer solution Take 3 mL (2.5 mg) by nebulization 4 times a day as needed for wheezing or shortness of breath. 75 mL 3    diazePAM (Valium) 2 mg tablet Take 1 tablet (2 mg) by mouth once daily as needed.      docusate sodium (Colace) 100 mg capsule Take 1 capsule (100 mg) by mouth 2 times a day. 60 capsule 2    fluticasone propion-salmeteroL (Advair Diskus) 500-50 mcg/dose diskus inhaler Inhale 1 puff 2 times a day. Rinse mouth with water after use to reduce aftertaste and incidence of candidiasis. Do not swallow. 60 each 11     mg tablet Take 1 tablet (800 mg) by mouth 3 times daily (morning, midday, late afternoon). prn      lidocaine (Lidoderm) 5 % patch Place 1 patch over 12 hours on the skin once daily. Remove & discard patch within 12 hours or as directed by MD. 20 patch 2    montelukast (Singulair) 10 mg tablet Take 1 tablet (10 mg) by mouth once daily at bedtime. STOP IF MOOD CHANGES. 30 tablet 11    nicotine polacrilex (Nicorette) 2 mg gum Chew 1 each (2 mg) if needed for smoking cessation. 100 each 0    nitroglycerin (Nitrostat) 0.4 mg SL tablet DISSOLVE ONE TABLET UNDER TONGUE every 5 minutes for up to 3 doses as needed for chest pain. call 911 if pain persists.      Spiriva with HandiHaler 18 mcg inhalation capsule Place 1 capsule (18 mcg) into inhaler and inhale once daily. GO TO ER IF VISUAL/EYE PAIN OR URINARY RETENTION OR PALPITATIONS OR CHEST PAIN OR 30 capsule 11     Ventolin HFA 90 mcg/actuation inhaler Inhale 1-2 puffs every 6 hours if needed for wheezing or shortness of breath. EVERY 4 TO 6 HOURS AS NEEDED 18 g 11     No current facility-administered medications for this visit.       Physical Examination:      3/11/2024     9:57 AM 3/11/2024    10:00 AM 3/11/2024    10:15 AM 3/11/2024    10:30 AM 3/11/2024    10:44 AM 3/11/2024    10:45 AM 7/26/2024     9:38 AM   Vitals   Systolic 154 145 141 132 134 134 158   Diastolic 92 96 85 79 79 79 89   Heart Rate 78 69  72 78 74 73   Temp 36.2 °C (97.2 °F) 36.2 °C (97.2 °F)   36.3 °C (97.3 °F) 36.3 °C (97.3 °F) 36.7 °C (98.1 °F)   Resp 18 15 16 19 17 16    Weight (lb)       120   BMI       19.37 kg/m2   BSA (m2)       1.59 m2   Visit Report       Report      There is no height or weight on file to calculate BMI.    Well-appearing, appears stated age, pleasant and cooperative, appropriate mood and behavior. Height and weight reviewed. Alert and oriented x3.  Auditory function intact.  No acute distress.  Intact ocular function, ALBERT, EOMI. Breathing is unlabored .  There is no evidence of jugular venous distension. Skin appearance is normal without evidence of rash or other lesions. 2+ radial pulses bilaterally, fingers pink and wwp, good capillary refill, no pitting edema. No appreciable lymphadenopathy in bilateral upper extremities. SILT throughout both upper extremities, median/radial/ulnar/musculocutaneous/axillary nerve motor and sensory intact (except for abnormalities noted in focused musculoskeletal exam section below).     Neck exam: Full range of motion of the neck in flexion/extension and rotational movements. No significant areas of tenderness to palpation in the neck.    On exam of bilateral upper extremities, active forward flexion of the left to 90, external Tatian of 40, internal rotation to the side.  Rotator cuff strength is diminished on the left, 4 out of 5 supraspinatus testing on the left.  Passively, I can  achieve full range of motion but is very painful in the left side.    Imaging: MRI of the left shoulder was reviewed today.  Personally interpreted by myself.  Based on my interpretation, I do think that there is a retear of the posterior supraspinatus and infraspinatus.  There does appear to be healing of the anterior portion of the repair.    Assessment: 7 months s/p surgery, retear after rotator cuff repair due to trauma    Plan: Unfortunately, the patient continues to have significant pain and weakness in the left shoulder after an arthroscopic rotator cuff repair and biceps tenodesis 6 months ago.  The patient was doing well until he fell 4 months ago.  MRI confirms a retear of the rotator cuff tendon.  At this point the patient has failed extensive physical therapy and has significant pain and weakness in the left shoulder.  We discussed the option of an attempt at arthroscopic revision rotator cuff repair.  Did state that the success rates a revision surgeries are not as good as primary surgeries.  Do think that his retear is related to the fact that he fell off a ladder.  I counseled him that he really needs to stop smoking as this will help the tendon heal, as well as to abide by restrictions after this.  I told him this really is the last chance to get this tendon to heal, as we have already tried 1 time before.  He voiced understanding this and like to proceed.  We will get him booked for arthroscopic revision rotator cuff repair.    The patient has failed conservative management, including therapy and injections.  At this point, the patient is a candidate for surgery.  Patient is in agreement with this.  Risks and recovery after surgery were discussed.  The proposed procedure will be an arthroscopic rotator cuff repair revision.  Risks of surgery include bleeding, infection, failure of the tendon to heal, neurovascular injury, persistent pain, failure of the repair, and possible need for further surgery.   We discussed that there are certain risk factors for the rotator cuff tendon not healing to bone, including age over 65, large and massive rotator cuff tears, smoking, as well as others. All surgeries that are performed under anesthesia also have the risks of DVTs, pulmonary embolism, potentially death. Per anesthesia's evaluation, the patient will have a nerve block, the risks of which the anesthesia team will discuss with the patient.   Patient voiced understanding of these risks and wished to proceed.  Postoperative recovery involves being in a sling for 6 weeks without weightbearing, and then gradual therapy to strengthen the arm.  We did discuss that rotator cuff surgery has an extensive recovery, usually around 5 to 6 months until they are getting their strength back and probably feeling around 85% at that time. It is really 1 year until their arm is feeling completely better and at 100%.     The patient will need PATs which will also include a CBC, BMP, PT/INR and a full work up by the PAT team as well as anesthesia evaluation.  My office will work to get this scheduled. I will see them back 2 weeks after surgery.    Patient was prescribed a shoulder sling for postoperative care. The patient will have weakness, instability and/or deformity of their (right/left) arm which requires stabilization from this orthosis to improve their function after surgery. Verbal and written instructions for the use, wear schedule, cleaning and application of this item were given. Patient was instructed that should the brace result in increased pain, decreased sensation, increased swelling, or an overall worsening of their medical condition, to please contact our office immediately. Orthotic management and training was provided for skin care, modifications due to healing tissues, edema changes, interruption in skin integrity, and safety precautions with the orthosis.      Dragon software was used to dictate this note, please be  aware that minor errors in transcription may be present.    Nehemias Leach MD    Shoulder/Elbow Surgery  [facility] [place]

## 2024-10-31 ENCOUNTER — CLINICAL SUPPORT (OUTPATIENT)
Dept: PREADMISSION TESTING | Facility: HOSPITAL | Age: 59
End: 2024-10-31
Payer: MEDICARE

## 2024-10-31 DIAGNOSIS — S46.012D TRAUMATIC COMPLETE TEAR OF LEFT ROTATOR CUFF, SUBSEQUENT ENCOUNTER: ICD-10-CM

## 2024-10-31 DIAGNOSIS — Z98.890 HISTORY OF FAILED REPAIR OF ROTATOR CUFF: ICD-10-CM

## 2024-10-31 RX ORDER — OXYCODONE AND ACETAMINOPHEN 5; 325 MG/1; MG/1
1 TABLET ORAL EVERY 6 HOURS PRN
COMMUNITY
Start: 2024-10-21

## 2024-11-07 ENCOUNTER — LAB (OUTPATIENT)
Dept: LAB | Facility: LAB | Age: 59
End: 2024-11-07
Payer: MEDICARE

## 2024-11-07 ENCOUNTER — PRE-ADMISSION TESTING (OUTPATIENT)
Dept: PREADMISSION TESTING | Facility: HOSPITAL | Age: 59
End: 2024-11-07
Payer: MEDICARE

## 2024-11-07 ENCOUNTER — DOCUMENTATION (OUTPATIENT)
Dept: PREADMISSION TESTING | Facility: HOSPITAL | Age: 59
End: 2024-11-07

## 2024-11-07 VITALS
HEIGHT: 65 IN | TEMPERATURE: 98.1 F | HEART RATE: 83 BPM | RESPIRATION RATE: 20 BRPM | DIASTOLIC BLOOD PRESSURE: 73 MMHG | SYSTOLIC BLOOD PRESSURE: 136 MMHG | WEIGHT: 123.46 LBS | OXYGEN SATURATION: 96 % | BODY MASS INDEX: 20.57 KG/M2

## 2024-11-07 DIAGNOSIS — G47.33 OSA (OBSTRUCTIVE SLEEP APNEA): Primary | ICD-10-CM

## 2024-11-07 DIAGNOSIS — G47.33 OSA (OBSTRUCTIVE SLEEP APNEA): ICD-10-CM

## 2024-11-07 LAB
ANION GAP SERPL CALC-SCNC: 10 MMOL/L (ref 10–20)
BASOPHILS # BLD AUTO: 0.05 X10*3/UL (ref 0–0.1)
BASOPHILS NFR BLD AUTO: 0.6 %
BUN SERPL-MCNC: 16 MG/DL (ref 6–23)
CALCIUM SERPL-MCNC: 8.9 MG/DL (ref 8.6–10.3)
CHLORIDE SERPL-SCNC: 105 MMOL/L (ref 98–107)
CO2 SERPL-SCNC: 27 MMOL/L (ref 21–32)
CREAT SERPL-MCNC: 0.75 MG/DL (ref 0.5–1.3)
EGFRCR SERPLBLD CKD-EPI 2021: >90 ML/MIN/1.73M*2
EOSINOPHIL # BLD AUTO: 0.06 X10*3/UL (ref 0–0.7)
EOSINOPHIL NFR BLD AUTO: 0.7 %
ERYTHROCYTE [DISTWIDTH] IN BLOOD BY AUTOMATED COUNT: 14 % (ref 11.5–14.5)
GLUCOSE SERPL-MCNC: 92 MG/DL (ref 74–99)
HCT VFR BLD AUTO: 43.5 % (ref 41–52)
HGB BLD-MCNC: 14.2 G/DL (ref 13.5–17.5)
IMM GRANULOCYTES # BLD AUTO: 0.02 X10*3/UL (ref 0–0.7)
IMM GRANULOCYTES NFR BLD AUTO: 0.2 % (ref 0–0.9)
LYMPHOCYTES # BLD AUTO: 1.48 X10*3/UL (ref 1.2–4.8)
LYMPHOCYTES NFR BLD AUTO: 17.7 %
MCH RBC QN AUTO: 31.8 PG (ref 26–34)
MCHC RBC AUTO-ENTMCNC: 32.6 G/DL (ref 32–36)
MCV RBC AUTO: 98 FL (ref 80–100)
MONOCYTES # BLD AUTO: 0.7 X10*3/UL (ref 0.1–1)
MONOCYTES NFR BLD AUTO: 8.4 %
NEUTROPHILS # BLD AUTO: 6.04 X10*3/UL (ref 1.2–7.7)
NEUTROPHILS NFR BLD AUTO: 72.4 %
NRBC BLD-RTO: 0 /100 WBCS (ref 0–0)
PLATELET # BLD AUTO: 335 X10*3/UL (ref 150–450)
POTASSIUM SERPL-SCNC: 4.4 MMOL/L (ref 3.5–5.3)
RBC # BLD AUTO: 4.46 X10*6/UL (ref 4.5–5.9)
SODIUM SERPL-SCNC: 138 MMOL/L (ref 136–145)
WBC # BLD AUTO: 8.4 X10*3/UL (ref 4.4–11.3)

## 2024-11-07 PROCEDURE — 80048 BASIC METABOLIC PNL TOTAL CA: CPT

## 2024-11-07 PROCEDURE — 85025 COMPLETE CBC W/AUTO DIFF WBC: CPT

## 2024-11-07 PROCEDURE — 93010 ELECTROCARDIOGRAM REPORT: CPT | Performed by: INTERNAL MEDICINE

## 2024-11-07 PROCEDURE — 36415 COLL VENOUS BLD VENIPUNCTURE: CPT

## 2024-11-07 PROCEDURE — 99214 OFFICE O/P EST MOD 30 MIN: CPT | Performed by: NURSE PRACTITIONER

## 2024-11-07 PROCEDURE — 93005 ELECTROCARDIOGRAM TRACING: CPT | Performed by: NURSE PRACTITIONER

## 2024-11-07 ASSESSMENT — ENCOUNTER SYMPTOMS
SHORTNESS OF BREATH: 1
CONSTITUTIONAL NEGATIVE: 1
DYSPNEA WITH EXERTION: 1
MUSCULOSKELETAL NEGATIVE: 1
GASTROINTESTINAL NEGATIVE: 1
ENDOCRINE NEGATIVE: 1
NEUROLOGICAL NEGATIVE: 1
NECK NEGATIVE: 1

## 2024-11-07 NOTE — PREPROCEDURE INSTRUCTIONS
Medication List            Accurate as of November 7, 2024  2:08 PM. Always use your most recent med list.                acetaminophen 500 mg tablet  Commonly known as: Tylenol  Take 1 tablet (500 mg) by mouth every 6 hours if needed for mild pain (1 - 3).  Medication Adjustments for Surgery: Take/Use as prescribed     * albuterol 2.5 mg /3 mL (0.083 %) nebulizer solution  Take 3 mL (2.5 mg) by nebulization 4 times a day as needed for wheezing or shortness of breath.  Medication Adjustments for Surgery: Take/Use as prescribed     * Ventolin HFA 90 mcg/actuation inhaler  Generic drug: albuterol  Inhale 1-2 puffs every 6 hours if needed for wheezing or shortness of breath. EVERY 4 TO 6 HOURS AS NEEDED  Medication Adjustments for Surgery: Take/Use as prescribed     diazePAM 2 mg tablet  Commonly known as: Valium  Medication Adjustments for Surgery: Take/Use as prescribed     docusate sodium 100 mg capsule  Commonly known as: Colace  Take 1 capsule (100 mg) by mouth 2 times a day.  Medication Adjustments for Surgery: Do Not take on the morning of surgery     fluticasone propion-salmeteroL 500-50 mcg/dose diskus inhaler  Commonly known as: Advair Diskus  Inhale 1 puff 2 times a day. Rinse mouth with water after use to reduce aftertaste and incidence of candidiasis. Do not swallow.  Medication Adjustments for Surgery: Take/Use as prescribed      mg tablet  Generic drug: ibuprofen  Additional Medication Adjustments for Surgery: Take last dose 7 days before surgery     montelukast 10 mg tablet  Commonly known as: Singulair  Take 1 tablet (10 mg) by mouth once daily at bedtime. STOP IF MOOD CHANGES.  Medication Adjustments for Surgery: Do Not take on the morning of surgery     nicotine polacrilex 2 mg gum  Commonly known as: Nicorette  Chew 1 each (2 mg) if needed for smoking cessation.  Medication Adjustments for Surgery: Do Not take on the morning of surgery     nitroglycerin 0.4 mg SL tablet  Commonly known as:  Nitrostat  Medication Adjustments for Surgery: Take/Use as prescribed     oxyCODONE-acetaminophen 5-325 mg tablet  Commonly known as: Percocet  Medication Adjustments for Surgery: Take/Use as prescribed     Spiriva with HandiHaler 18 mcg inhalation capsule  Generic drug: tiotropium  Place 1 capsule (18 mcg) into inhaler and inhale once daily. GO TO ER IF VISUAL/EYE PAIN OR URINARY RETENTION OR PALPITATIONS OR CHEST PAIN OR  Medication Adjustments for Surgery: Take/Use as prescribed           * This list has 2 medication(s) that are the same as other medications prescribed for you. Read the directions carefully, and ask your doctor or other care provider to review them with you.              CONTACT SURGEON'S OFFICE IF YOU DEVELOP:  * Fever = 100.4 F   * New respiratory symptoms (e.g. cough, shortness of breath, respiratory distress, sore throat)  * Recent loss of taste or smell  *Flu like symptoms such as headache, fatigue or gastrointestinal symptoms  * You develop any open sores, shingles, burning or painful urination   AND/OR:  * You no longer wish to have the surgery.  * Any other personal circumstances change that may lead to the need to cancel or defer this surgery.  *You were admitted to any hospital within one week of your planned procedure.    SMOKING:  *Quitting smoking can make a huge difference to your health and recovery from surgery.    *If you need help with quitting, call 6-783-QUIT-NOW.    THE DAY BEFORE SURGERY:  *Do not eat any food after midnight the night before your surgery.   *You may have up to 13.5 OUNCES of clear liquids until TWO hours before your instructed ARRIVAL TIME to hospital. This includes water, black tea/coffee, (no milk or cream) apple juice, clear broth and electrolyte drinks (Gatorade). Please avoid clear liquids that are red in color.   *You may chew gum/mints up to TWO hours before your surgery/procedure.    SURGICAL TIME:  *You will be contacted between 2 p.m. and 6 p.m.  the business day before your surgery with your arrival time.  *If you haven't received a call by 6pm, call [phone].  *Scheduled surgery times may change and you will be notified if this occurs-check your personal voicemail for any updates.    ON THE MORNING OF SURGERY:  *Wear comfortable, loose fitting clothing.   *Do not use moisturizers, creams, lotions or perfume.  *All jewelry and valuables should be left at home.  *Prosthetic devices such as contact lenses, hearing aids, dentures, eyelash extensions, hairpins and body piercing must be removed before surgery.    BRING WITH YOU:  *Photo ID and insurance card  *Current list of medications and allergies  *Pacemaker/Defibrillator/Heart stent cards  *CPAP machine and mask  *Slings/splints/crutches  *Copy of your complete Advanced Directive/DHPOA-if applicable  *Neurostimulator implant remote    PARKING AND ARRIVAL:  *Check in at the Main Entrance desk and let them know you are here for surgery.  *You will be directed to the 2nd floor surgical waiting area.    IF YOU ARE HAVING OUTPATIENT/SAME DAY SURGERY:  *A responsible adult MUST accompany you at the time of discharge and stay with you for 24 hours after your surgery.  *You may NOT drive yourself home after surgery.  *You may use a taxi or ride sharing service ([employer], [employer]) to return home ONLY if you are accompanied by a friend or family member.  *Instructions for resuming your medications will be provided by your surgeon.

## 2024-11-07 NOTE — CPM/PAT H&P
[facility]       Name: Alber Reese (Alber Reese)  /Age: 1965/59 y.o.     In-Person         Date of Consult: 24    Referring Provider:  Dr. Leach    Date, Surgery, and Length:  24, left shoulder arthroscopy, rotator cuff repair, 120 minutes    Patient presents to [facility] for perioperative risk assessment prior to scheduled surgery. Patient presents post prior rotator cuff repair with persistent pain after new injury to left shoulder. Since that time he has attempted extensive physical therapy but the left shoulder still remains very painful.        This note was created in part upon personal review of patient's medical records.      Pt denies any past history of anesthetic complications such as PONV, awareness, prolonged sedation, dental damage, aspiration, cardiac arrest, difficult intubation, difficult I.V. access or unexpected hospital admissions. No history of malignant hyperthermia and or pseudocholinesterase deficiency.    No history of blood transfusions.    The patient IS NOT a [employer] and will accept blood and blood products if medically indicated.     Type and screen NOT sent.      Past Medical History:   Diagnosis Date    Bleb, lung (Multi)     s/p resection in     Cigarette smoker     COPD (chronic obstructive pulmonary disease) (Multi)     Emphysema lung (Multi)     pulmonary, home O2    H/O alcohol abuse     Lung nodule     Nocturnal hypoxemia     Wears O2 QHS and during day 89% RA, 92% on 2LNC    Other specified health status 2020    No pertinent past medical history    Requires continuous at home supplemental oxygen     2.5 L QHS    Sleep-disordered breathing        Past Surgical History:   Procedure Laterality Date    BRONCHOSCOPY  2022    CARDIOVASCULAR STRESS TEST  10/14/2022    IMPRESSION: 1. There is no evidence of ischemia or prior infarction. 2. The left ventricle is normal in size. 3. Normal LV wall motion with stress LV EF estimated at 52%.     CT GUIDED PERCUTANEOUS BIOPSY LUNG  11/10/2022    CT GUIDED PERCUTANEOUS BIOPSY LUNG 11/10/2022 [facility]    ECHOCARDIOGRAM 2 D M MODE PANEL  05/27/2021    CONCLUSIONS:  1. The left ventricular systolic function is normal with a 55-60% estimated ejection fraction.  2. The left atrium is moderate to severely dilated.    HERNIA REPAIR      inguinal    KNEE SURGERY Right 1994    LUNG REMOVAL, PARTIAL  2000    blebs s/p resection       Family History   Problem Relation Name Age of Onset    No Known Problems Mother      Lung cancer Father      Accidental death Brother       Social History     Tobacco Use   Smoking Status Some Days    Current packs/day: 1.00    Average packs/day: 1 pack/day for 40.0 years (40.0 ttl pk-yrs)    Types: Cigarettes   Smokeless Tobacco Never   Tobacco Comments    10 cigs/day trying to quit      Social History     Substance and Sexual Activity   Alcohol Use Yes    Alcohol/week: 6.0 standard drinks of alcohol    Types: 6 Cans of beer per week     Social History     Substance and Sexual Activity   Drug Use Not Currently         No Known Allergies      Current Outpatient Medications:     acetaminophen (Tylenol) 500 mg tablet, Take 1 tablet (500 mg) by mouth every 6 hours if needed for mild pain (1 - 3)., Disp: 30 tablet, Rfl: 2    albuterol 2.5 mg /3 mL (0.083 %) nebulizer solution, Take 3 mL (2.5 mg) by nebulization 4 times a day as needed for wheezing or shortness of breath., Disp: 75 mL, Rfl: 3    diazePAM (Valium) 2 mg tablet, Take 1 tablet (2 mg) by mouth once daily as needed., Disp: , Rfl:     fluticasone propion-salmeteroL (Advair Diskus) 500-50 mcg/dose diskus inhaler, Inhale 1 puff 2 times a day. Rinse mouth with water after use to reduce aftertaste and incidence of candidiasis. Do not swallow., Disp: 60 each, Rfl: 11     mg tablet, Take 1 tablet (800 mg) by mouth 3 times daily (morning, midday, late afternoon). prn, Disp: , Rfl:     oxyCODONE-acetaminophen (Percocet) 5-325  "mg tablet, Take 1 tablet by mouth every 6 hours if needed. 1/2 tablet, left shoulder, Disp: , Rfl:     Ventolin HFA 90 mcg/actuation inhaler, Inhale 1-2 puffs every 6 hours if needed for wheezing or shortness of breath. EVERY 4 TO 6 HOURS AS NEEDED, Disp: 18 g, Rfl: 11    docusate sodium (Colace) 100 mg capsule, Take 1 capsule (100 mg) by mouth 2 times a day. (Patient not taking: Reported on 10/31/2024), Disp: 60 capsule, Rfl: 2    montelukast (Singulair) 10 mg tablet, Take 1 tablet (10 mg) by mouth once daily at bedtime. STOP IF MOOD CHANGES. (Patient not taking: Reported on 11/7/2024), Disp: 30 tablet, Rfl: 11    nicotine polacrilex (Nicorette) 2 mg gum, Chew 1 each (2 mg) if needed for smoking cessation., Disp: 100 each, Rfl: 0    nitroglycerin (Nitrostat) 0.4 mg SL tablet, DISSOLVE ONE TABLET UNDER TONGUE every 5 minutes for up to 3 doses as needed for chest pain. call 911 if pain persists. (Patient not taking: Reported on 11/7/2024), Disp: , Rfl:     Spiriva with HandiHaler 18 mcg inhalation capsule, Place 1 capsule (18 mcg) into inhaler and inhale once daily. GO TO ER IF VISUAL/EYE PAIN OR URINARY RETENTION OR PALPITATIONS OR CHEST PAIN OR (Patient not taking: Reported on 11/7/2024), Disp: 30 capsule, Rfl: 11      PAT ROS:   Constitutional:   neg    Neuro/Psych:   neg    Eyes:    use of corrective lenses  Ears:   neg    Nose:   neg    Mouth:   neg    Throat:   neg    Neck:   neg    Cardio:    LEDEZMA  Respiratory:    shortness of breath  Endocrine:   neg    GI:   neg    :   neg    Musculoskeletal:   neg    Hematologic:   neg    Skin:  neg        Physical Exam  Vitals reviewed. Physical exam within normal limits.          PAT AIRWAY:   Airway:     Mallampati::  II    Neck ROM::  Full  normal          Visit Vitals  /73   Pulse 83   Temp 36.7 °C (98.1 °F)   Resp 20   Ht 1.651 m (5' 5\")   Wt 56 kg (123 lb 7.3 oz)   SpO2 96%   BMI 20.54 kg/m²   Smoking Status Some Days   BSA 1.6 m²       Assessment and Plan: "     Patient is a 59 year old female scheduled for left shoulder arthroscopy with rotator cuff repair with Dr. Leach on 11/18/24.    Patient is at acceptable risk to proceed with planned surgical procedure. Further cardiac risk stratification deferred at this time.This patient is INTERMEDIATE-HIGH risk candidate undergoing MODERATE risk procedure, patient is medically optimized for surgery.    Plan    Neuro:    Anxiety- continue valium as needed    History of alcohol dependence- Monitor for signs and symptoms of substance withdrawal during the postoperative period, assess, and treat as needed with the appropriate monitoring tool.      Cardiovascular:    RCRI: 1 Risk of Mace: 6%      Patient denies any chest pain, tightness, heaviness, pressure, radiating pain, palpitations, irregular heartbeats, lightheadedness, cough, congestion, shortness of breath, LEDEZMA, PND, near syncope, weight loss or gain.    Fair functional capacity- limited by severed COPD      EKG in [facility] 11/7/24  Encounter Date: 11/07/24   ECG 12 Lead   Result Value    Ventricular Rate 73    Atrial Rate 73    VA Interval 138    QRS Duration 88    QT Interval 406    QTC Calculation(Bazett) 447    P Axis 80    R Axis 72    T Axis 74    QRS Count 12    Q Onset 222    P Onset 153    P Offset 199    T Offset 425    QTC Fredericia 433    Narrative    Normal sinus rhythm  Minimal voltage criteria for LVH, may be normal variant  Borderline ECG  When compared with ECG of 29-FEB-2024 14:23,  No significant change was found  Confirmed by Larry Dunn ([id number]) on 11/8/2024 5:59:33 PM       Pulm:  Known or suspected SCOTT is considered an independent risk factor for difficult mask ventilation, difficult intubation or both.  Increased vigilance is recommended with the use of narcotics due to an increased risk for opioid induced respiratory depression.  The patient may benefit from continuous pulse oximetry to monitor for hypoxic events until baseline Sp02 is normal on room  "air.      Stop Bang= 2, male, age >50    Patient has history of nicotine dependency. Smoking cessation education was provided to the patient.Cessation encouraged. - Physiological and physical aspects of tobacco addiction as well as strategies for quitting were discussed. - Counseling was given focusing on the harmful effects of this addiction especially given the patient's medical condition(s) which will be worsened because of the chemicals in tobacco      Severe COPD/emphysema- require night time oxygen. Followed by pulmonology team. Last seen by Dr. Carlin. Per note:    \" Pre op pulmonary evaluation; based on hypoxia, COPD and active smoking, he is a high risk patient going for intermediate risk surgery, which overall place him at an intermediate risk to develop niko op pulmonary complications. But there is no absolute contra-indication. Recommend:        Early ambulation       Continue COPD medication in the niko op period       DVT prophylaxis       Incentive spirometry, acapella. \"    Renal/endo:  Recommendations to avoid nephrotoxic drugs and carefully monitor fluid status to maintain euvolemia. Use dose adjusted medications as needed for the underlying level of renal function.      Heme:  Patient instructed to ambulate as soon as possible postoperatively to decrease thromboembolic risk.    Initiate mechanical DVT prophylaxis as soon as possible and initiate chemical prophylaxis when deemed safe from a bleeding standpoint post surgery.        Caprini= 4      Risk assessment complete.  Patient is scheduled for a INTERMEDIATE surgical risk procedure.  He IS considered medically optimized for the planned procedure.      Labs/testing obtained in [facility] on 11/7/24: CBC, BMP, EKG    Lab Results   Component Value Date    WBC 8.4 11/07/2024    HGB 14.2 11/07/2024    HCT 43.5 11/07/2024    MCV 98 11/07/2024     11/07/2024     Lab Results   Component Value Date    GLUCOSE 92 11/07/2024    CALCIUM 8.9 11/07/2024 "     11/07/2024    K 4.4 11/07/2024    CO2 27 11/07/2024     11/07/2024    BUN 16 11/07/2024    CREATININE 0.75 11/07/2024       Follow up/communication: none      Preoperative medication instructions were provided and reviewed with the patient.  Any additional testing or evaluation was explained to the patient.  Nothing by mouth instructions were discussed and patient's questions were answered prior to conclusion to this encounter.  Patient verbalized understanding of preoperative instructions given in preadmission testing; discharge instructions available in EMR.    This note was dictated with speech recognition.  Minor errors may have been detected during use of speech recognition.

## 2024-11-07 NOTE — H&P (VIEW-ONLY)
[facility] Evaluation       Name: Alber Reese (Alber Reese)  /Age: 1965/59 y.o.     In-Person         Date of Consult: 24    Referring Provider:  Dr. Leach    Date, Surgery, and Length:  24, left shoulder arthroscopy, rotator cuff repair, 120 minutes    Patient presents to [facility] for perioperative risk assessment prior to scheduled surgery. Patient presents post prior rotator cuff repair with persistent pain after new injury to left shoulder. Since that time he has attempted extensive physical therapy but the left shoulder still remains very painful.        This note was created in part upon personal review of patient's medical records.      Pt denies any past history of anesthetic complications such as PONV, awareness, prolonged sedation, dental damage, aspiration, cardiac arrest, difficult intubation, difficult I.V. access or unexpected hospital admissions. No history of malignant hyperthermia and or pseudocholinesterase deficiency.    No history of blood transfusions.    The patient IS NOT a Confucianism and will accept blood and blood products if medically indicated.     Type and screen NOT sent.      Past Medical History:   Diagnosis Date    Bleb, lung (Multi)     s/p resection in     Cigarette smoker     COPD (chronic obstructive pulmonary disease) (Multi)     Emphysema lung (Multi)     pulmonary, home O2    H/O alcohol abuse     Lung nodule     Nocturnal hypoxemia     Wears O2 QHS and during day 89% RA, 92% on 2LNC    Other specified health status 2020    No pertinent past medical history    Requires continuous at home supplemental oxygen     2.5 L QHS    Sleep-disordered breathing        Past Surgical History:   Procedure Laterality Date    BRONCHOSCOPY  2022    CARDIOVASCULAR STRESS TEST  10/14/2022    IMPRESSION: 1. There is no evidence of ischemia or prior infarction. 2. The left ventricle is normal in size. 3. Normal LV wall motion with stress LV EF estimated at 52%.     CT GUIDED PERCUTANEOUS BIOPSY LUNG  11/10/2022    CT GUIDED PERCUTANEOUS BIOPSY LUNG 11/10/2022 PAR [facility]IB LEGACY    ECHOCARDIOGRAM 2 D M MODE PANEL  05/27/2021    CONCLUSIONS:  1. The left ventricular systolic function is normal with a 55-60% estimated ejection fraction.  2. The left atrium is moderate to severely dilated.    HERNIA REPAIR      inguinal    KNEE SURGERY Right 1994    LUNG REMOVAL, PARTIAL  2000    blebs s/p resection       Family History   Problem Relation Name Age of Onset    No Known Problems Mother      Lung cancer Father      Accidental death Brother       Social History     Tobacco Use   Smoking Status Some Days    Current packs/day: 1.00    Average packs/day: 1 pack/day for 40.0 years (40.0 ttl pk-yrs)    Types: Cigarettes   Smokeless Tobacco Never   Tobacco Comments    10 cigs/day trying to quit      Social History     Substance and Sexual Activity   Alcohol Use Yes    Alcohol/week: 6.0 standard drinks of alcohol    Types: 6 Cans of beer per week     Social History     Substance and Sexual Activity   Drug Use Not Currently         No Known Allergies      Current Outpatient Medications:     acetaminophen (Tylenol) 500 mg tablet, Take 1 tablet (500 mg) by mouth every 6 hours if needed for mild pain (1 - 3)., Disp: 30 tablet, Rfl: 2    albuterol 2.5 mg /3 mL (0.083 %) nebulizer solution, Take 3 mL (2.5 mg) by nebulization 4 times a day as needed for wheezing or shortness of breath., Disp: 75 mL, Rfl: 3    diazePAM (Valium) 2 mg tablet, Take 1 tablet (2 mg) by mouth once daily as needed., Disp: , Rfl:     fluticasone propion-salmeteroL (Advair Diskus) 500-50 mcg/dose diskus inhaler, Inhale 1 puff 2 times a day. Rinse mouth with water after use to reduce aftertaste and incidence of candidiasis. Do not swallow., Disp: 60 each, Rfl: 11     mg tablet, Take 1 tablet (800 mg) by mouth 3 times daily (morning, midday, late afternoon). prn, Disp: , Rfl:     oxyCODONE-acetaminophen (Percocet) 5-325  "mg tablet, Take 1 tablet by mouth every 6 hours if needed. 1/2 tablet, left shoulder, Disp: , Rfl:     Ventolin HFA 90 mcg/actuation inhaler, Inhale 1-2 puffs every 6 hours if needed for wheezing or shortness of breath. EVERY 4 TO 6 HOURS AS NEEDED, Disp: 18 g, Rfl: 11    docusate sodium (Colace) 100 mg capsule, Take 1 capsule (100 mg) by mouth 2 times a day. (Patient not taking: Reported on 10/31/2024), Disp: 60 capsule, Rfl: 2    montelukast (Singulair) 10 mg tablet, Take 1 tablet (10 mg) by mouth once daily at bedtime. STOP IF MOOD CHANGES. (Patient not taking: Reported on 11/7/2024), Disp: 30 tablet, Rfl: 11    nicotine polacrilex (Nicorette) 2 mg gum, Chew 1 each (2 mg) if needed for smoking cessation., Disp: 100 each, Rfl: 0    nitroglycerin (Nitrostat) 0.4 mg SL tablet, DISSOLVE ONE TABLET UNDER TONGUE every 5 minutes for up to 3 doses as needed for chest pain. call 911 if pain persists. (Patient not taking: Reported on 11/7/2024), Disp: , Rfl:     Spiriva with HandiHaler 18 mcg inhalation capsule, Place 1 capsule (18 mcg) into inhaler and inhale once daily. GO TO ER IF VISUAL/EYE PAIN OR URINARY RETENTION OR PALPITATIONS OR CHEST PAIN OR (Patient not taking: Reported on 11/7/2024), Disp: 30 capsule, Rfl: 11      PAT ROS:   Constitutional:   neg    Neuro/Psych:   neg    Eyes:    use of corrective lenses  Ears:   neg    Nose:   neg    Mouth:   neg    Throat:   neg    Neck:   neg    Cardio:    LEDEZMA  Respiratory:    shortness of breath  Endocrine:   neg    GI:   neg    :   neg    Musculoskeletal:   neg    Hematologic:   neg    Skin:  neg        Physical Exam  Vitals reviewed. Physical exam within normal limits.          PAT AIRWAY:   Airway:     Mallampati::  II    Neck ROM::  Full  normal          Visit Vitals  /73   Pulse 83   Temp 36.7 °C (98.1 °F)   Resp 20   Ht 1.651 m (5' 5\")   Wt 56 kg (123 lb 7.3 oz)   SpO2 96%   BMI 20.54 kg/m²   Smoking Status Some Days   BSA 1.6 m²       Assessment and Plan: "     Patient is a 59 year old female scheduled for left shoulder arthroscopy with rotator cuff repair with Dr. Leach on 11/18/24.    Patient is at acceptable risk to proceed with planned surgical procedure. Further cardiac risk stratification deferred at this time.This patient is INTERMEDIATE-HIGH risk candidate undergoing MODERATE risk procedure, patient is medically optimized for surgery.    Plan    Neuro:    Anxiety- continue valium as needed    History of alcohol dependence- Monitor for signs and symptoms of substance withdrawal during the postoperative period, assess, and treat as needed with the appropriate monitoring tool.      Cardiovascular:    RCRI: 1 Risk of Mace: 6%      Patient denies any chest pain, tightness, heaviness, pressure, radiating pain, palpitations, irregular heartbeats, lightheadedness, cough, congestion, shortness of breath, LEDEZMA, PND, near syncope, weight loss or gain.    Fair functional capacity- limited by severed COPD      EKG in [facility] 11/7/24  Encounter Date: 11/07/24   ECG 12 Lead   Result Value    Ventricular Rate 73    Atrial Rate 73    OH Interval 138    QRS Duration 88    QT Interval 406    QTC Calculation(Bazett) 447    P Axis 80    R Axis 72    T Axis 74    QRS Count 12    Q Onset 222    P Onset 153    P Offset 199    T Offset 425    QTC Fredericia 433    Narrative    Normal sinus rhythm  Minimal voltage criteria for LVH, may be normal variant  Borderline ECG  When compared with ECG of 29-FEB-2024 14:23,  No significant change was found  Confirmed by Larry Dunn ([id number]) on 11/8/2024 5:59:33 PM       Pulm:  Known or suspected SCOTT is considered an independent risk factor for difficult mask ventilation, difficult intubation or both.  Increased vigilance is recommended with the use of narcotics due to an increased risk for opioid induced respiratory depression.  The patient may benefit from continuous pulse oximetry to monitor for hypoxic events until baseline Sp02 is normal on room  "air.      Stop Bang= 2, male, age >50    Patient has history of nicotine dependency. Smoking cessation education was provided to the patient.Cessation encouraged. - Physiological and physical aspects of tobacco addiction as well as strategies for quitting were discussed. - Counseling was given focusing on the harmful effects of this addiction especially given the patient's medical condition(s) which will be worsened because of the chemicals in tobacco      Severe COPD/emphysema- require night time oxygen. Followed by pulmonology team. Last seen by Dr. Carlin. Per note:    \" Pre op pulmonary evaluation; based on hypoxia, COPD and active smoking, he is a high risk patient going for intermediate risk surgery, which overall place him at an intermediate risk to develop niko op pulmonary complications. But there is no absolute contra-indication. Recommend:        Early ambulation       Continue COPD medication in the niko op period       DVT prophylaxis       Incentive spirometry, acapella. \"    Renal/endo:  Recommendations to avoid nephrotoxic drugs and carefully monitor fluid status to maintain euvolemia. Use dose adjusted medications as needed for the underlying level of renal function.      Heme:  Patient instructed to ambulate as soon as possible postoperatively to decrease thromboembolic risk.    Initiate mechanical DVT prophylaxis as soon as possible and initiate chemical prophylaxis when deemed safe from a bleeding standpoint post surgery.        Caprini= 4      Risk assessment complete.  Patient is scheduled for a INTERMEDIATE surgical risk procedure.  He IS considered medically optimized for the planned procedure.      Labs/testing obtained in [facility] on 11/7/24: CBC, BMP, EKG    Lab Results   Component Value Date    WBC 8.4 11/07/2024    HGB 14.2 11/07/2024    HCT 43.5 11/07/2024    MCV 98 11/07/2024     11/07/2024     Lab Results   Component Value Date    GLUCOSE 92 11/07/2024    CALCIUM 8.9 11/07/2024 "     11/07/2024    K 4.4 11/07/2024    CO2 27 11/07/2024     11/07/2024    BUN 16 11/07/2024    CREATININE 0.75 11/07/2024       Follow up/communication: none      Preoperative medication instructions were provided and reviewed with the patient.  Any additional testing or evaluation was explained to the patient.  Nothing by mouth instructions were discussed and patient's questions were answered prior to conclusion to this encounter.  Patient verbalized understanding of preoperative instructions given in preadmission testing; discharge instructions available in EMR.    This note was dictated with speech recognition.  Minor errors may have been detected during use of speech recognition.

## 2024-11-07 NOTE — CPM/PAT NURSE NOTE
CPM/PAT Nurse Note      Name: Alber Reese (Alber Reese)  /Age: 1965/59 y.o.       Past Medical History:   Diagnosis Date    Bleb, lung (Multi)     s/p resection in     Cigarette smoker     COPD (chronic obstructive pulmonary disease) (Multi)     Emphysema lung (Multi)     pulmonary, home O2    H/O alcohol abuse     Lung nodule     Nocturnal hypoxemia     Wears O2 QHS and during day 89% RA, 92% on 2LNC    Other specified health status 2020    No pertinent past medical history    Requires continuous at home supplemental oxygen     2.5 L QHS    Sleep-disordered breathing        Past Surgical History:   Procedure Laterality Date    BRONCHOSCOPY  2022    CARDIOVASCULAR STRESS TEST  10/14/2022    IMPRESSION: 1. There is no evidence of ischemia or prior infarction. 2. The left ventricle is normal in size. 3. Normal LV wall motion with stress LV EF estimated at 52%.    CT GUIDED PERCUTANEOUS BIOPSY LUNG  11/10/2022    CT GUIDED PERCUTANEOUS BIOPSY LUNG 11/10/2022 PAR [facility]IB LEGACY    ECHOCARDIOGRAM 2 D M MODE PANEL  2021    CONCLUSIONS:  1. The left ventricular systolic function is normal with a 55-60% estimated ejection fraction.  2. The left atrium is moderate to severely dilated.    HERNIA REPAIR      inguinal    KNEE SURGERY Right     LUNG REMOVAL, PARTIAL      blebs s/p resection       Patient Sexual activity questions deferred to the physician.    Family History   Problem Relation Name Age of Onset    No Known Problems Mother      Lung cancer Father      Accidental death Brother         No Known Allergies    Prior to Admission medications    Medication Sig Start Date End Date Taking? Authorizing Provider   acetaminophen (Tylenol) 500 mg tablet Take 1 tablet (500 mg) by mouth every 6 hours if needed for mild pain (1 - 3). 10/9/24   Nehemias Leach MD   albuterol 2.5 mg /3 mL (0.083 %) nebulizer solution Take 3 mL (2.5 mg) by nebulization 4 times a day as needed for wheezing or shortness  of breath. 1/31/24   Sully Carlin MD   diazePAM (Valium) 2 mg tablet Take 1 tablet (2 mg) by mouth once daily as needed. 10/24/23   Historical Provider, MD   docusate sodium (Colace) 100 mg capsule Take 1 capsule (100 mg) by mouth 2 times a day.  Patient not taking: Reported on 10/31/2024 3/14/24   Nehemias Leach MD   fluticasone propion-salmeteroL (Advair Diskus) 500-50 mcg/dose diskus inhaler Inhale 1 puff 2 times a day. Rinse mouth with water after use to reduce aftertaste and incidence of candidiasis. Do not swallow. 8/16/24 8/16/25  Sully Carlin MD    mg tablet Take 1 tablet (800 mg) by mouth 3 times daily (morning, midday, late afternoon). prn 10/24/23   Historical Provider, MD   montelukast (Singulair) 10 mg tablet Take 1 tablet (10 mg) by mouth once daily at bedtime. STOP IF MOOD CHANGES.  Patient not taking: Reported on 11/7/2024 7/26/24 7/26/25  Sully Carlin MD   nicotine polacrilex (Nicorette) 2 mg gum Chew 1 each (2 mg) if needed for smoking cessation. 7/26/24 8/25/24  Sully Carlin MD   nitroglycerin (Nitrostat) 0.4 mg SL tablet DISSOLVE ONE TABLET UNDER TONGUE every 5 minutes for up to 3 doses as needed for chest pain. call 911 if pain persists.  Patient not taking: Reported on 11/7/2024    Historical Provider, MD   oxyCODONE-acetaminophen (Percocet) 5-325 mg tablet Take 1 tablet by mouth every 6 hours if needed. 1/2 tablet, left shoulder 10/21/24   Historical Provider, MD   Spiriva with HandiHaler 18 mcg inhalation capsule Place 1 capsule (18 mcg) into inhaler and inhale once daily. GO TO ER IF VISUAL/EYE PAIN OR URINARY RETENTION OR PALPITATIONS OR CHEST PAIN OR  Patient not taking: Reported on 11/7/2024 7/26/24 7/26/25  Sully Carlin MD   Ventolin HFA 90 mcg/actuation inhaler Inhale 1-2 puffs every 6 hours if needed for wheezing or shortness of breath. EVERY 4 TO 6 HOURS AS NEEDED 7/26/24 7/26/25  MD BEST Arnold Risk Score    No data  to display       Caprini DVT Assessment      Flowsheet Row Pre-Admission Testing from 2/29/2024 in [facility] with CHICO Vargas   DVT Score 7 filed at 02/29/2024 1423   BMI 30 or less filed at 02/29/2024 1423   RETIRED: Current Status COPD, Major surgery planned, including arthroscopic and laproscopic (1-2 hours) filed at 02/29/2024 1423   RETIRED: History Prior major surgery, COPD filed at 02/29/2024 1423   RETIRED: Age 40-59 years filed at 02/29/2024 1423          Modified Frailty Index    No data to display       CHADS2 Stroke Risk  Current as of 6 hours ago        N/A 3 to 100%: High Risk   2 to < 3%: Medium Risk   0 to < 2%: Low Risk     Last Change: N/A          This score determines the patient's risk of having a stroke if the patient has atrial fibrillation.        This score is not applicable to this patient. Components are not calculated.          Revised Cardiac Risk Index    No data to display       Apfel Simplified Score    No data to display       Risk Analysis Index Results This Encounter    No data found in the last 10 encounters.       Prodigy: High Risk  Total Score: 19              Prodigy Age Score      Prodigy Gender Score     Prodigy Previous Opioid Use Score           ARISCAT Score for Postoperative Pulmonary Complications    No data to display       Pierce Perioperative Risk for Myocardial Infarction or Cardiac Arrest (BELEM)    No data to display         Nurse Plan of Action: After Visit Summary (AVS) reviewed and patient verbalized good understanding of medications and NPO instructions.

## 2024-11-08 ENCOUNTER — OFFICE VISIT (OUTPATIENT)
Dept: PULMONOLOGY | Facility: CLINIC | Age: 59
End: 2024-11-08
Payer: MEDICARE

## 2024-11-08 VITALS
RESPIRATION RATE: 16 BRPM | OXYGEN SATURATION: 97 % | SYSTOLIC BLOOD PRESSURE: 152 MMHG | TEMPERATURE: 97.5 F | HEIGHT: 65 IN | DIASTOLIC BLOOD PRESSURE: 83 MMHG | WEIGHT: 133.1 LBS | BODY MASS INDEX: 22.17 KG/M2 | HEART RATE: 66 BPM

## 2024-11-08 DIAGNOSIS — R91.1 LUNG NODULE: ICD-10-CM

## 2024-11-08 DIAGNOSIS — G47.34 NOCTURNAL HYPOXIA: ICD-10-CM

## 2024-11-08 DIAGNOSIS — R06.09 DYSPNEA ON EXERTION: ICD-10-CM

## 2024-11-08 DIAGNOSIS — J98.4 CAVITARY LESION OF LUNG: Primary | ICD-10-CM

## 2024-11-08 DIAGNOSIS — F17.219 CIGARETTE NICOTINE DEPENDENCE WITH NICOTINE-INDUCED DISORDER: ICD-10-CM

## 2024-11-08 DIAGNOSIS — J43.9 PULMONARY EMPHYSEMA, UNSPECIFIED EMPHYSEMA TYPE (MULTI): ICD-10-CM

## 2024-11-08 LAB
ATRIAL RATE: 73 BPM
P AXIS: 80 DEGREES
P OFFSET: 199 MS
P ONSET: 153 MS
PR INTERVAL: 138 MS
Q ONSET: 222 MS
QRS COUNT: 12 BEATS
QRS DURATION: 88 MS
QT INTERVAL: 406 MS
QTC CALCULATION(BAZETT): 447 MS
QTC FREDERICIA: 433 MS
R AXIS: 72 DEGREES
T AXIS: 74 DEGREES
T OFFSET: 425 MS
VENTRICULAR RATE: 73 BPM

## 2024-11-08 PROCEDURE — 99215 OFFICE O/P EST HI 40 MIN: CPT | Performed by: INTERNAL MEDICINE

## 2024-11-08 PROCEDURE — 3008F BODY MASS INDEX DOCD: CPT | Performed by: INTERNAL MEDICINE

## 2024-11-08 PROCEDURE — 99417 PROLNG OP E/M EACH 15 MIN: CPT | Performed by: INTERNAL MEDICINE

## 2024-11-08 RX ORDER — MONTELUKAST SODIUM 10 MG/1
10 TABLET ORAL NIGHTLY
Qty: 30 TABLET | Refills: 11 | Status: SHIPPED | OUTPATIENT
Start: 2024-11-08 | End: 2025-11-08

## 2024-11-08 RX ORDER — TIOTROPIUM BROMIDE 18 UG/1
1 CAPSULE ORAL; RESPIRATORY (INHALATION)
Qty: 30 CAPSULE | Refills: 11 | Status: SHIPPED | OUTPATIENT
Start: 2024-11-08 | End: 2025-11-08

## 2024-11-08 RX ORDER — ALBUTEROL SULFATE 90 UG/1
1-2 AEROSOL, METERED RESPIRATORY (INHALATION) EVERY 6 HOURS PRN
Qty: 18 G | Refills: 11 | Status: SHIPPED | OUTPATIENT
Start: 2024-11-08 | End: 2025-11-08

## 2024-11-08 RX ORDER — FLUTICASONE PROPIONATE AND SALMETEROL 500; 50 UG/1; UG/1
1 POWDER RESPIRATORY (INHALATION)
Qty: 60 EACH | Refills: 11 | Status: SHIPPED | OUTPATIENT
Start: 2024-11-08 | End: 2025-11-08

## 2024-11-08 RX ORDER — ALBUTEROL SULFATE 0.83 MG/ML
2.5 SOLUTION RESPIRATORY (INHALATION) 4 TIMES DAILY PRN
Qty: 75 ML | Refills: 3 | Status: SHIPPED | OUTPATIENT
Start: 2024-11-08

## 2024-11-08 ASSESSMENT — ENCOUNTER SYMPTOMS
PALPITATIONS: 0
RHINORRHEA: 0
VOMITING: 0
CONSTIPATION: 0
HEMATURIA: 0
SINUS PAIN: 0
APPETITE CHANGE: 0
SHORTNESS OF BREATH: 1
DIZZINESS: 0
EYE ITCHING: 0
EYE PAIN: 0
FEVER: 0
WHEEZING: 1
CHEST TIGHTNESS: 0
FREQUENCY: 0
BACK PAIN: 1
DIARRHEA: 0
SEIZURES: 0
CONFUSION: 0
DYSPHORIC MOOD: 0
NERVOUS/ANXIOUS: 0
NECK PAIN: 1
CHILLS: 0
NAUSEA: 0
FATIGUE: 1
WOUND: 0
HEADACHES: 0
DYSURIA: 0
UNEXPECTED WEIGHT CHANGE: 0
SINUS PRESSURE: 0
SORE THROAT: 0
COUGH: 1
MYALGIAS: 1
LIGHT-HEADEDNESS: 0
ABDOMINAL PAIN: 0
EYE REDNESS: 0
ARTHRALGIAS: 1

## 2024-11-08 ASSESSMENT — COPD QUESTIONNAIRES
QUESTION5_HOMEACTIVITIES: 4
QUESTION6_LEAVINGHOUSE: 1
QUESTION4_WALKINCLINE: 5 - WHEN I WALK UP A HILL OR ONE FLIGHT OF STAIRS I AM VERY BREATHLESS
QUESTION2_CHESTPHLEGM: 3
QUESTION8_ENERGYLEVEL: 5 - I HAVE NO ENERGY AT ALL
QUESTION7_SLEEPQUALITY: 4
CAT_TOTALSCORE: 28
QUESTION1_COUGHFREQUENCY: 3
QUESTION3_CHESTTIGHTNESS: 3

## 2024-11-08 NOTE — PATIENT INSTRUCTIONS
Mr. Crawley,     It was a pleasure to talk to you today. We discussed the followings:     1. Lung mass/nodule: given it has minimal activity on PET scan and it was stable in size in your repeat chest CT for a while. However your repeat chest CT in 9/2022 showed enlarging nodule. You subsequently had a biopsy done that did not show any cancer. Your PET CT scan showed it is stable. To further evaluate please collect your sputum and take it to the lab.     2. COPD: your breathing test showed that you have moderate COPD. Given your symptoms are not well controlled, please make sure you will attend the pulmonary rehab. Your six minute walk test result showed that you did not qualify for oxygen supplementation. Please continue Symbicort, Spiriva, Montelukast and Ventolin as needed.      3. Smoking: Please make every effort to quit smoking. I am re-starting you on Nicotine gum.      4. Chest pain: please make sure you are following up with a heart doctor.     5. Low oxygen level: please continue to wear your supplemental oxygen at nights.         Please return to the clinic in 3-6 months with the result of the above tests.

## 2024-11-08 NOTE — PROGRESS NOTES
Subjective   59 YOM with h/o hernia, pulmonary emphysema/blebs s/p resection in 2000, now is being seen for lung nodules.   Patient had not seen a doctor in ten years until he saw Dr. Barboza. Had screening chest CT done in Sep 2020 that showed a ? lung nodules, subsequently had PET done that showed mild activity, for that is referred to my office. Patient subsequently had more work up done as detailed below.   Last seen 4 months ago, no major events since then.   Patient had nocturnal pulse oximetry done, qualified for nocturnal O2 therapy. Using it every night, with improvement of his symptoms.   Currently on Advair, Spiriva, Montelukast and albuterol. Continues to use his albuterol on several times a day. Of note currently is not taking any maintenance inhaler.   Overall is feeling the same as the last visit.   CC SOB, both at rest and with activity, but mostly activity. LEDEZMA after walking 80 feet without oxygen or climbing 5-6 steps. +ve orthopnea. +ve PND, several times every night. No LE edema. LEDEZMA started 2019, gradual onset, progressive. Associated with wheezing but no chest tightness. LEDEZMA stable since the last visit.   +ve wheezing, several times a day. States wheezing improves with albuterol.  Wheezing stable since the last visit.   +ve chronic cough, moderate, mostly in the morning, rarely productive of clear sputum, no h/o hemoptysis. Cough and sputum for years now stable since the last visit.  Having problem falling sleep and staying sleep. +ve snoring, Does not feel rested in am.   Exposures: quit smoking 7/2021, but started again, then quit again 12/2023. Now smokes sporadically. Around 40-60 pack/year h/o smoking. 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   Review of Systems   Constitutional:  Positive for fatigue. Negative for appetite change, chills, fever and unexpected weight change.   HENT:  Negative for congestion, ear pain, postnasal drip, rhinorrhea, sinus pressure, sinus pain and  sore throat.    Eyes:  Negative for pain, redness, itching and visual disturbance.   Respiratory:  Positive for cough, shortness of breath and wheezing. Negative for chest tightness.    Cardiovascular:  Negative for chest pain, palpitations and leg swelling.   Gastrointestinal:  Negative for abdominal pain, constipation, diarrhea, nausea and vomiting.   Genitourinary:  Negative for dysuria, frequency and hematuria.   Musculoskeletal:  Positive for arthralgias, back pain, myalgias and neck pain.   Skin:  Negative for pallor, rash and wound.   Neurological:  Negative for dizziness, seizures, syncope, light-headedness and headaches.   Psychiatric/Behavioral:  Negative for confusion and dysphoric mood. The patient is not nervous/anxious.       Current Outpatient Medications   Medication Instructions    acetaminophen (TYLENOL) 500 mg, oral, Every 6 hours PRN    albuterol 2.5 mg, nebulization, 4 times daily PRN    diazePAM (VALIUM) 2 mg, Daily PRN    docusate sodium (COLACE) 100 mg, oral, 2 times daily    fluticasone propion-salmeteroL (Advair Diskus) 500-50 mcg/dose diskus inhaler 1 puff, inhalation, 2 times daily RT, Rinse mouth with water after use to reduce aftertaste and incidence of candidiasis. Do not swallow.     mg tablet 1 tablet, 3 times daily (morning, midday, late afternoon)    montelukast (SINGULAIR) 10 mg, oral, Nightly, STOP IF MOOD CHANGES.    nicotine polacrilex (NICORETTE) 2 mg, Mouth/Throat, As needed    nitroglycerin (Nitrostat) 0.4 mg SL tablet DISSOLVE ONE TABLET UNDER TONGUE every 5 minutes for up to 3 doses as needed for chest pain. call 911 if pain persists.    oxyCODONE-acetaminophen (Percocet) 5-325 mg tablet 1 tablet, Every 6 hours PRN    Spiriva with HandiHaler 18 mcg, inhalation, Daily RT, GO TO ER IF VISUAL/EYE PAIN OR URINARY RETENTION OR PALPITATIONS OR CHEST PAIN OR    Ventolin HFA 90 mcg/actuation inhaler 1-2 puffs, inhalation, Every 6 hours PRN, EVERY 4 TO 6 HOURS AS NEEDED       Objective   Visit Vitals  /83 (BP Location: Right arm, Patient Position: Sitting)   Pulse 66   Temp 36.4 °C (97.5 °F) (Temporal)   Resp 16      Physical Exam  Constitutional:       Appearance: Normal appearance.      Comments: Thin.    HENT:      Head: Normocephalic and atraumatic.      Nose:      Comments: On RA.      Mouth/Throat:      Mouth: Mucous membranes are moist.      Comments: Mallampati 2-3  Eyes:      Extraocular Movements: Extraocular movements intact.      Pupils: Pupils are equal, round, and reactive to light.   Cardiovascular:      Rate and Rhythm: Normal rate and regular rhythm.      Heart sounds: Normal heart sounds. No murmur heard.     No friction rub. No gallop.   Pulmonary:      Effort: Pulmonary effort is normal. No respiratory distress.      Breath sounds: No wheezing or rales.      Comments: Clear lung fields b/l with fair air entry.   Abdominal:      General: There is no distension.      Palpations: Abdomen is soft.      Tenderness: There is no abdominal tenderness. There is no guarding.   Musculoskeletal:         General: No swelling or tenderness. Normal range of motion.      Cervical back: Normal range of motion and neck supple. No rigidity or tenderness.      Right lower leg: No edema.      Left lower leg: No edema.   Lymphadenopathy:      Cervical: No cervical adenopathy.   Skin:     General: Skin is warm and dry.      Coloration: Skin is not jaundiced.   Neurological:      General: No focal deficit present.      Mental Status: He is alert and oriented to person, place, and time.      Cranial Nerves: No cranial nerve deficit.      Motor: No weakness.   Psychiatric:         Mood and Affect: Mood normal.         Behavior: Behavior normal.     Relevant Results  CAT from today 28.   No other studies since the last visit.     The followings were reviewed during previous visits      I personally reviewed the images for the PET/CT from 6/2024 that showed:       Redemonstration of the  thick-walled cavities of the right upper lobe exhibiting overall mild metabolic activity with more focal areas of further mild increased metabolic activity along its superolateral aspect and inferolateral aspect. Overall, findings are decreased in comparison the prior PET study. Findings are again suggestive of an inflammatory/infectious process. Malignant involvement is felt to be less likely, but cannot be definitively excluded. If there is a high clinical suspicion for a neoplastic process, tissue sampling may be of value with attention to either the superolateral aspect or inferior medial aspect. No other definite areas of abnormal hypermetabolic tracer activity to  suggest an active neoplastic/metastatic process are identified.      I personally reviewed the images for the chest CT from 11/2023  that showed:  some progression of wall thickening at probable blebs and bulla at the right upper lung, likely due to infection such as aspergillosis or fungus. A cavitary malignant lesion is not excluded.  I personally reviewed the images for the following studies (official reads as below):    CT from 4/10/2023 that showed: interval decrease in RUL cavitary lesion   CT from 3/2023 that showed: interval decrease in the RUL cavitary lesion and subpleural mass like lesion   CT from 9/2022 that showed worsening of the RUL cavitary lesion   6 MWT from 9/2022 reviewed, walked 340/477 (55.2%), no desaturation on RA.   I personally interpreted the PFT from 9/2022 that showed: FEV1/FVC 39%, FEV1 40-->47% (19%) with bronchodilator response, TLC 87%, RV/%, DLCO 56-->70%.   Stress test in 10/2022 negative.  Sleep study result from 1/2022 reviewed that showed RDI 3% 18.4%, RDI 4% 9.4%, emelia O2 80s.  Assessment/Plan   59 YOM with h/o bullectomy, smoking, now is being seen for LEDEZMA and lung mass/nodule.      1. Lung mass/nodule: ? scar. Bullectomy both lungs in 2000. 2.9 x 1.7 cm right apex mass/speculated and an 8 mm RUL  nodule, both only mild uptake on PET. Patient is high risk given h/o smoking and recent h/o weight loss. Also father  of lung cancer. CT chest done, that showed stable nodule and likely scar tissue. Repeat chest CT in 2021 showed enlarging RUL nodule. PET increased uptake, s/p bronch + biopsy that showed chronic inflammation with occasional not necrotizing granuloma. Patient had repeat chest CT done in Sep 2022 that showed increased in size of the cavity. Subsequently was referred to thoracic surgery who ordered a CT guided biopsy which was done in 2022. Cultures +ve for pseudomonas, but otherwise negative. Cytology negative. Pathology showed dense fibroadipose tissue with chronic inflammation. Was treated with Levaquin for 2 weeks. Repeat CTs in March and 2023 showed continuous improvement. However CT in 2023 showed worsening RUL thickening of bullae concerning for infection and malignancy. Especially since he has lost weight and has worsening symptoms.   PET/CT of the chest done, overall low suspicious for malignancy.    Sputum for AFB, fungal and bacterial cultures ordered, but not collected yet.      2. COPD: PFT showed moderate COPD. Significant air trapping, marked LEDEZMA, on LAMA + LABA + ICS and rescue inhaler. Rescue inhaler need is now very frequent. Symptoms stable since the last visit. CAT today 28. Category B. Started on Anoro previously, but could not afford due to high Co-payment. Now back on Symbicort + Spiriva (ran out of it). Overall his COPD is stable.               Continue Symbicort, Spiriva and Singulair (all were re-ordered)   continue Ventolin as needed.    6 MWT done, no desaturation   nocturnal pulse O2 done, qualified for nocturnal oxygen, advised to continue to wear   pulmonary rehab referral done, has not attended yet.      3. Smoking: quit smoking 2021, but started again. Tried nicotine patch without any effect. Quit again 2023. Now intermittently smoked.     will monitor for now.      4. LEDEZMA: likely from COPD, had cardiac work up done.    2D echocardiogram done, only showed dilated LA   stress test in 10/2022 also negative.      5. Weight loss: ? cause, pulmonary work up negative for cancer. Now overall stable.    advised to discuss this with his primary care. Has not talked to them yet.     6. Pre op pulmonary evaluation; based on hypoxia, COPD and active smoking, he is a high risk patient going for intermediate risk surgery, which overall place him at an intermediate risk to develop niko op pulmonary complications. But there is no absolute contra-indication. Recommend:        Early ambulation       Continue COPD medication in the niko op period       DVT prophylaxis       Incentive spirometry, acapella.     RTC in 3-6 months.      Sully Carlin MD

## 2024-11-15 ENCOUNTER — ANESTHESIA EVENT (OUTPATIENT)
Dept: OPERATING ROOM | Facility: HOSPITAL | Age: 59
End: 2024-11-15
Payer: MEDICARE

## 2024-11-15 DIAGNOSIS — J43.9 PULMONARY EMPHYSEMA, UNSPECIFIED EMPHYSEMA TYPE (MULTI): ICD-10-CM

## 2024-11-15 RX ORDER — ALBUTEROL SULFATE 90 UG/1
AEROSOL, METERED RESPIRATORY (INHALATION)
Qty: 18 G | Refills: 11 | Status: SHIPPED | OUTPATIENT
Start: 2024-11-15

## 2024-11-18 ENCOUNTER — ANESTHESIA (OUTPATIENT)
Dept: OPERATING ROOM | Facility: HOSPITAL | Age: 59
End: 2024-11-18
Payer: MEDICARE

## 2024-11-18 ENCOUNTER — HOSPITAL ENCOUNTER (OUTPATIENT)
Facility: HOSPITAL | Age: 59
Setting detail: OUTPATIENT SURGERY
Discharge: HOME | End: 2024-11-18
Attending: STUDENT IN AN ORGANIZED HEALTH CARE EDUCATION/TRAINING PROGRAM | Admitting: STUDENT IN AN ORGANIZED HEALTH CARE EDUCATION/TRAINING PROGRAM
Payer: MEDICARE

## 2024-11-18 ENCOUNTER — PHARMACY VISIT (OUTPATIENT)
Dept: PHARMACY | Facility: CLINIC | Age: 59
End: 2024-11-18
Payer: COMMERCIAL

## 2024-11-18 VITALS
HEART RATE: 74 BPM | DIASTOLIC BLOOD PRESSURE: 80 MMHG | OXYGEN SATURATION: 91 % | WEIGHT: 123.46 LBS | SYSTOLIC BLOOD PRESSURE: 121 MMHG | RESPIRATION RATE: 17 BRPM | TEMPERATURE: 97.5 F | BODY MASS INDEX: 20.57 KG/M2 | HEIGHT: 65 IN

## 2024-11-18 DIAGNOSIS — Z98.890 HISTORY OF FAILED REPAIR OF ROTATOR CUFF: Primary | ICD-10-CM

## 2024-11-18 DIAGNOSIS — S46.012D TRAUMATIC COMPLETE TEAR OF LEFT ROTATOR CUFF, SUBSEQUENT ENCOUNTER: ICD-10-CM

## 2024-11-18 PROBLEM — J96.11 CHRONIC HYPOXIC RESPIRATORY FAILURE, ON HOME OXYGEN THERAPY (MULTI): Status: ACTIVE | Noted: 2024-11-18

## 2024-11-18 PROBLEM — J44.9 COPD (CHRONIC OBSTRUCTIVE PULMONARY DISEASE) (MULTI): Status: ACTIVE | Noted: 2023-10-27

## 2024-11-18 PROBLEM — Z99.81 CHRONIC HYPOXIC RESPIRATORY FAILURE, ON HOME OXYGEN THERAPY (MULTI): Status: ACTIVE | Noted: 2024-11-18

## 2024-11-18 PROCEDURE — 3700000002 HC GENERAL ANESTHESIA TIME - EACH INCREMENTAL 1 MINUTE: Performed by: STUDENT IN AN ORGANIZED HEALTH CARE EDUCATION/TRAINING PROGRAM

## 2024-11-18 PROCEDURE — 2500000005 HC RX 250 GENERAL PHARMACY W/O HCPCS

## 2024-11-18 PROCEDURE — 64415 NJX AA&/STRD BRCH PLXS IMG: CPT | Performed by: ANESTHESIOLOGY

## 2024-11-18 PROCEDURE — 2500000001 HC RX 250 WO HCPCS SELF ADMINISTERED DRUGS (ALT 637 FOR MEDICARE OP)

## 2024-11-18 PROCEDURE — 29827 SHO ARTHRS SRG RT8TR CUF RPR: CPT | Performed by: STUDENT IN AN ORGANIZED HEALTH CARE EDUCATION/TRAINING PROGRAM

## 2024-11-18 PROCEDURE — 3700000001 HC GENERAL ANESTHESIA TIME - INITIAL BASE CHARGE: Performed by: STUDENT IN AN ORGANIZED HEALTH CARE EDUCATION/TRAINING PROGRAM

## 2024-11-18 PROCEDURE — 7100000001 HC RECOVERY ROOM TIME - INITIAL BASE CHARGE: Performed by: STUDENT IN AN ORGANIZED HEALTH CARE EDUCATION/TRAINING PROGRAM

## 2024-11-18 PROCEDURE — 2500000004 HC RX 250 GENERAL PHARMACY W/ HCPCS (ALT 636 FOR OP/ED)

## 2024-11-18 PROCEDURE — 2500000004 HC RX 250 GENERAL PHARMACY W/ HCPCS (ALT 636 FOR OP/ED): Performed by: STUDENT IN AN ORGANIZED HEALTH CARE EDUCATION/TRAINING PROGRAM

## 2024-11-18 PROCEDURE — 29823 SHO ARTHRS SRG XTNSV DBRDMT: CPT | Performed by: STUDENT IN AN ORGANIZED HEALTH CARE EDUCATION/TRAINING PROGRAM

## 2024-11-18 PROCEDURE — 7100000010 HC PHASE TWO TIME - EACH INCREMENTAL 1 MINUTE: Performed by: STUDENT IN AN ORGANIZED HEALTH CARE EDUCATION/TRAINING PROGRAM

## 2024-11-18 PROCEDURE — 3600000009 HC OR TIME - EACH INCREMENTAL 1 MINUTE - PROCEDURE LEVEL FOUR: Performed by: STUDENT IN AN ORGANIZED HEALTH CARE EDUCATION/TRAINING PROGRAM

## 2024-11-18 PROCEDURE — C1713 ANCHOR/SCREW BN/BN,TIS/BN: HCPCS | Performed by: STUDENT IN AN ORGANIZED HEALTH CARE EDUCATION/TRAINING PROGRAM

## 2024-11-18 PROCEDURE — 3600000004 HC OR TIME - INITIAL BASE CHARGE - PROCEDURE LEVEL FOUR: Performed by: STUDENT IN AN ORGANIZED HEALTH CARE EDUCATION/TRAINING PROGRAM

## 2024-11-18 PROCEDURE — 2500000001 HC RX 250 WO HCPCS SELF ADMINISTERED DRUGS (ALT 637 FOR MEDICARE OP): Performed by: ANESTHESIOLOGY

## 2024-11-18 PROCEDURE — 7100000002 HC RECOVERY ROOM TIME - EACH INCREMENTAL 1 MINUTE: Performed by: STUDENT IN AN ORGANIZED HEALTH CARE EDUCATION/TRAINING PROGRAM

## 2024-11-18 PROCEDURE — P9045 ALBUMIN (HUMAN), 5%, 250 ML: HCPCS | Mod: JZ

## 2024-11-18 PROCEDURE — 2500000005 HC RX 250 GENERAL PHARMACY W/O HCPCS: Performed by: ANESTHESIOLOGY

## 2024-11-18 PROCEDURE — A29827 PR SHLDR ARTHROSCOP,SURG,W/ROTAT CUFF REPR

## 2024-11-18 PROCEDURE — 7100000009 HC PHASE TWO TIME - INITIAL BASE CHARGE: Performed by: STUDENT IN AN ORGANIZED HEALTH CARE EDUCATION/TRAINING PROGRAM

## 2024-11-18 PROCEDURE — A29827 PR SHLDR ARTHROSCOP,SURG,W/ROTAT CUFF REPR: Performed by: ANESTHESIOLOGY

## 2024-11-18 PROCEDURE — RXMED WILLOW AMBULATORY MEDICATION CHARGE

## 2024-11-18 PROCEDURE — 2780000003 HC OR 278 NO HCPCS: Performed by: STUDENT IN AN ORGANIZED HEALTH CARE EDUCATION/TRAINING PROGRAM

## 2024-11-18 PROCEDURE — 2720000007 HC OR 272 NO HCPCS: Performed by: STUDENT IN AN ORGANIZED HEALTH CARE EDUCATION/TRAINING PROGRAM

## 2024-11-18 PROCEDURE — C1781 MESH (IMPLANTABLE): HCPCS | Performed by: STUDENT IN AN ORGANIZED HEALTH CARE EDUCATION/TRAINING PROGRAM

## 2024-11-18 DEVICE — IMPLANTABLE DEVICE: Type: IMPLANTABLE DEVICE | Site: SHOULDER | Status: FUNCTIONAL

## 2024-11-18 DEVICE — ANCHORS, TENDON 8: Type: IMPLANTABLE DEVICE | Site: SHOULDER | Status: FUNCTIONAL

## 2024-11-18 DEVICE — BONE ANCHORS 3, WITH ARTHRO DELIVERY SYS ADVANCED: Type: IMPLANTABLE DEVICE | Site: SHOULDER | Status: FUNCTIONAL

## 2024-11-18 RX ORDER — ROCURONIUM BROMIDE 10 MG/ML
INJECTION, SOLUTION INTRAVENOUS AS NEEDED
Status: DISCONTINUED | OUTPATIENT
Start: 2024-11-18 | End: 2024-11-18

## 2024-11-18 RX ORDER — HYDROMORPHONE HYDROCHLORIDE 1 MG/ML
0.25 INJECTION, SOLUTION INTRAMUSCULAR; INTRAVENOUS; SUBCUTANEOUS EVERY 5 MIN PRN
Status: DISCONTINUED | OUTPATIENT
Start: 2024-11-18 | End: 2024-11-18 | Stop reason: HOSPADM

## 2024-11-18 RX ORDER — LIDOCAINE HYDROCHLORIDE 20 MG/ML
INJECTION, SOLUTION EPIDURAL; INFILTRATION; INTRACAUDAL; PERINEURAL AS NEEDED
Status: DISCONTINUED | OUTPATIENT
Start: 2024-11-18 | End: 2024-11-18

## 2024-11-18 RX ORDER — SODIUM CHLORIDE, SODIUM LACTATE, POTASSIUM CHLORIDE, AND CALCIUM CHLORIDE .6; .31; .03; .02 G/100ML; G/100ML; G/100ML; G/100ML
IRRIGANT IRRIGATION AS NEEDED
Status: DISCONTINUED | OUTPATIENT
Start: 2024-11-18 | End: 2024-11-18 | Stop reason: HOSPADM

## 2024-11-18 RX ORDER — ONDANSETRON HYDROCHLORIDE 2 MG/ML
4 INJECTION, SOLUTION INTRAVENOUS ONCE AS NEEDED
Status: DISCONTINUED | OUTPATIENT
Start: 2024-11-18 | End: 2024-11-18 | Stop reason: HOSPADM

## 2024-11-18 RX ORDER — PROPOFOL 10 MG/ML
INJECTION, EMULSION INTRAVENOUS AS NEEDED
Status: DISCONTINUED | OUTPATIENT
Start: 2024-11-18 | End: 2024-11-18

## 2024-11-18 RX ORDER — ACETAMINOPHEN 500 MG
1000 TABLET ORAL EVERY 6 HOURS PRN
Qty: 30 TABLET | Refills: 2 | Status: SHIPPED | OUTPATIENT
Start: 2024-11-18 | End: 2024-11-30

## 2024-11-18 RX ORDER — CEFAZOLIN 1 G/1
INJECTION, POWDER, FOR SOLUTION INTRAVENOUS AS NEEDED
Status: DISCONTINUED | OUTPATIENT
Start: 2024-11-18 | End: 2024-11-18

## 2024-11-18 RX ORDER — LIDOCAINE HYDROCHLORIDE 40 MG/ML
SOLUTION TOPICAL AS NEEDED
Status: DISCONTINUED | OUTPATIENT
Start: 2024-11-18 | End: 2024-11-18

## 2024-11-18 RX ORDER — OXYCODONE AND ACETAMINOPHEN 5; 325 MG/1; MG/1
1 TABLET ORAL EVERY 6 HOURS PRN
Qty: 28 TABLET | Refills: 0 | Status: SHIPPED | OUTPATIENT
Start: 2024-11-18 | End: 2024-11-25

## 2024-11-18 RX ORDER — MIDAZOLAM HYDROCHLORIDE 1 MG/ML
INJECTION, SOLUTION INTRAMUSCULAR; INTRAVENOUS AS NEEDED
Status: DISCONTINUED | OUTPATIENT
Start: 2024-11-18 | End: 2024-11-18

## 2024-11-18 RX ORDER — DROPERIDOL 2.5 MG/ML
0.62 INJECTION, SOLUTION INTRAMUSCULAR; INTRAVENOUS ONCE AS NEEDED
Status: DISCONTINUED | OUTPATIENT
Start: 2024-11-18 | End: 2024-11-18 | Stop reason: HOSPADM

## 2024-11-18 RX ORDER — PHENYLEPHRINE HCL IN 0.9% NACL 1 MG/10 ML
SYRINGE (ML) INTRAVENOUS AS NEEDED
Status: DISCONTINUED | OUTPATIENT
Start: 2024-11-18 | End: 2024-11-18

## 2024-11-18 RX ORDER — ONDANSETRON HYDROCHLORIDE 2 MG/ML
INJECTION, SOLUTION INTRAVENOUS AS NEEDED
Status: DISCONTINUED | OUTPATIENT
Start: 2024-11-18 | End: 2024-11-18

## 2024-11-18 RX ORDER — HYDROMORPHONE HYDROCHLORIDE 1 MG/ML
0.5 INJECTION, SOLUTION INTRAMUSCULAR; INTRAVENOUS; SUBCUTANEOUS EVERY 5 MIN PRN
Status: DISCONTINUED | OUTPATIENT
Start: 2024-11-18 | End: 2024-11-18 | Stop reason: HOSPADM

## 2024-11-18 RX ORDER — CEFAZOLIN SODIUM 2 G/100ML
2 INJECTION, SOLUTION INTRAVENOUS ONCE
Status: DISCONTINUED | OUTPATIENT
Start: 2024-11-18 | End: 2024-11-18 | Stop reason: HOSPADM

## 2024-11-18 RX ORDER — ALBUTEROL SULFATE 90 UG/1
INHALANT RESPIRATORY (INHALATION) AS NEEDED
Status: DISCONTINUED | OUTPATIENT
Start: 2024-11-18 | End: 2024-11-18

## 2024-11-18 RX ORDER — OXYCODONE HYDROCHLORIDE 5 MG/1
5 TABLET ORAL
Status: DISCONTINUED | OUTPATIENT
Start: 2024-11-18 | End: 2024-11-18 | Stop reason: HOSPADM

## 2024-11-18 RX ORDER — HYDRALAZINE HYDROCHLORIDE 20 MG/ML
5 INJECTION INTRAMUSCULAR; INTRAVENOUS EVERY 30 MIN PRN
Status: DISCONTINUED | OUTPATIENT
Start: 2024-11-18 | End: 2024-11-18 | Stop reason: HOSPADM

## 2024-11-18 RX ORDER — SODIUM CHLORIDE, SODIUM LACTATE, POTASSIUM CHLORIDE, CALCIUM CHLORIDE 600; 310; 30; 20 MG/100ML; MG/100ML; MG/100ML; MG/100ML
20 INJECTION, SOLUTION INTRAVENOUS CONTINUOUS
Status: DISCONTINUED | OUTPATIENT
Start: 2024-11-18 | End: 2024-11-18 | Stop reason: HOSPADM

## 2024-11-18 RX ORDER — ONDANSETRON 4 MG/1
4 TABLET, FILM COATED ORAL EVERY 8 HOURS PRN
Qty: 30 TABLET | Refills: 2 | Status: SHIPPED | OUTPATIENT
Start: 2024-11-18 | End: 2024-12-18

## 2024-11-18 RX ORDER — FENTANYL CITRATE 50 UG/ML
INJECTION, SOLUTION INTRAMUSCULAR; INTRAVENOUS CONTINUOUS PRN
Status: DISCONTINUED | OUTPATIENT
Start: 2024-11-18 | End: 2024-11-18

## 2024-11-18 RX ORDER — PROCHLORPERAZINE EDISYLATE 5 MG/ML
5 INJECTION INTRAMUSCULAR; INTRAVENOUS ONCE AS NEEDED
Status: DISCONTINUED | OUTPATIENT
Start: 2024-11-18 | End: 2024-11-18 | Stop reason: HOSPADM

## 2024-11-18 RX ORDER — SODIUM CHLORIDE, SODIUM LACTATE, POTASSIUM CHLORIDE, CALCIUM CHLORIDE 600; 310; 30; 20 MG/100ML; MG/100ML; MG/100ML; MG/100ML
100 INJECTION, SOLUTION INTRAVENOUS CONTINUOUS
Status: ACTIVE | OUTPATIENT
Start: 2024-11-18 | End: 2024-11-18

## 2024-11-18 RX ORDER — ASPIRIN 81 MG/1
81 TABLET ORAL DAILY
Qty: 14 TABLET | Refills: 0 | Status: SHIPPED | OUTPATIENT
Start: 2024-11-18 | End: 2024-12-02

## 2024-11-18 RX ORDER — KETOROLAC TROMETHAMINE 30 MG/ML
INJECTION, SOLUTION INTRAMUSCULAR; INTRAVENOUS AS NEEDED
Status: DISCONTINUED | OUTPATIENT
Start: 2024-11-18 | End: 2024-11-18

## 2024-11-18 RX ORDER — ALBUMIN HUMAN 50 G/1000ML
SOLUTION INTRAVENOUS AS NEEDED
Status: DISCONTINUED | OUTPATIENT
Start: 2024-11-18 | End: 2024-11-18

## 2024-11-18 RX ORDER — DOCUSATE SODIUM 100 MG/1
100 CAPSULE, LIQUID FILLED ORAL 2 TIMES DAILY
Qty: 60 CAPSULE | Refills: 0 | Status: SHIPPED | OUTPATIENT
Start: 2024-11-18 | End: 2024-12-18

## 2024-11-18 RX ORDER — SODIUM CHLORIDE, SODIUM LACTATE, POTASSIUM CHLORIDE, CALCIUM CHLORIDE 600; 310; 30; 20 MG/100ML; MG/100ML; MG/100ML; MG/100ML
20 INJECTION, SOLUTION INTRAVENOUS CONTINUOUS
Status: ACTIVE | OUTPATIENT
Start: 2024-11-18 | End: 2024-11-18

## 2024-11-18 SDOH — HEALTH STABILITY: MENTAL HEALTH: CURRENT SMOKER: 1

## 2024-11-18 ASSESSMENT — PAIN SCALES - GENERAL
PAINLEVEL_OUTOF10: 0 - NO PAIN
PAINLEVEL_OUTOF10: 8
PAINLEVEL_OUTOF10: 3
PAINLEVEL_OUTOF10: 0 - NO PAIN
PAINLEVEL_OUTOF10: 3
PAINLEVEL_OUTOF10: 0 - NO PAIN

## 2024-11-18 ASSESSMENT — PAIN - FUNCTIONAL ASSESSMENT
PAIN_FUNCTIONAL_ASSESSMENT: 0-10
PAIN_FUNCTIONAL_ASSESSMENT: UNABLE TO SELF-REPORT
PAIN_FUNCTIONAL_ASSESSMENT: 0-10
PAIN_FUNCTIONAL_ASSESSMENT: UNABLE TO SELF-REPORT
PAIN_FUNCTIONAL_ASSESSMENT: 0-10

## 2024-11-18 ASSESSMENT — PAIN DESCRIPTION - ORIENTATION: ORIENTATION: LEFT

## 2024-11-18 ASSESSMENT — COLUMBIA-SUICIDE SEVERITY RATING SCALE - C-SSRS
6. HAVE YOU EVER DONE ANYTHING, STARTED TO DO ANYTHING, OR PREPARED TO DO ANYTHING TO END YOUR LIFE?: NO
2. HAVE YOU ACTUALLY HAD ANY THOUGHTS OF KILLING YOURSELF?: NO
1. IN THE PAST MONTH, HAVE YOU WISHED YOU WERE DEAD OR WISHED YOU COULD GO TO SLEEP AND NOT WAKE UP?: NO

## 2024-11-18 ASSESSMENT — PAIN DESCRIPTION - LOCATION: LOCATION: SHOULDER

## 2024-11-18 NOTE — ANESTHESIA PROCEDURE NOTES
Peripheral Block    Patient location during procedure: pre-op  Start time: 11/18/2024 10:16 AM  End time: 11/18/2024 10:22 AM  Reason for block: at surgeon's request and post-op pain management  Staffing  Performed: attending   Authorized by: Katy Forde MD    Performed by: Katy Forde MD  Preanesthetic Checklist  Completed: patient identified, IV checked, site marked, risks and benefits discussed, surgical consent, monitors and equipment checked, pre-op evaluation and timeout performed   Timeout performed at: 11/18/2024 10:14 AM  Peripheral Block  Patient position: sitting  Prep: ChloraPrep and site prepped and draped  Patient monitoring: heart rate, cardiac monitor and continuous pulse ox  Block type: interscalene and brachial plexus  Laterality: left  Injection technique: single-shot  Guidance: nerve stimulator, ultrasound guided and ultrasound image saved to chart.  Local infiltration: lidocaine  Infiltration strength: 1 %  Dose: 2 mL  Needle  Needle type: short-bevel   Needle gauge: 22 G  Needle length: 5 cm  Needle localization: anatomical landmarks, nerve stimulator and ultrasound guidance  Test dose: negative  Assessment  Injection assessment: negative aspiration for heme, no paresthesia on injection, incremental injection and local visualized surrounding nerve on ultrasound  Paresthesia pain: none  Heart rate change: no  Slow fractionated injection: no  Additional Notes  Prior to Procedure:  Focussed neurological history elicited. Patient related and procedure specific risks discussed including, but not limited to: bleeding, infection, nerve injury, injury to surrounding structures, prolonged numbness or weakness, local anesthetic toxicity and increased pain after block resolution. 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 Verbal consent obtained from patient and/or surrogate decision maker. Anticoagulation (if any) was held per  NIURKA guidelines.    Pre-medication with Versed 1 mg intravenously.  No Evoked Motor Response was visible at < 0.3 mA. Using hydrodissection with a blunt-tip, echogenic needle, the block was performed under dynamic ultrasound guidance. With in-plane needle visualization, 15 ml of 0.5% Bupivacaine with epi 5 mcg/ml, precedex 20 mcg and decadron 4 mg was injected extraneurally in 5 ml increments. Care was taken to avoid intraneural injection or expansion of intraneural tissue. There was no resistance to injection and appropriate injection pressures were maintained based on tactile feedback. Throughout the procedure, there was consistent and meaningful verbal communication with the patient. Post procedure vital signs were stable and no immediate complications were noted. PACU will provide written instructions that outline the specific precautions to be taken when caring for an akinetic, insensate extremity.

## 2024-11-18 NOTE — ANESTHESIA PROCEDURE NOTES
Airway  Date/Time: 11/18/2024 11:11 AM  Urgency: elective    Airway not difficult    Staffing  Performed: SRNA   Authorized by: Katy Forde MD    Performed by: MAC Ram  Patient location during procedure: OR    Indications and Patient Condition  Indications for airway management: anesthesia and airway protection  Spontaneous Ventilation: absent  Sedation level: deep  Preoxygenated: yes  Patient position: sniffing  Mask difficulty assessment: 2 - vent by mask + OA or adjuvant +/- NMBA  Planned trial extubation    Final Airway Details  Final airway type: endotracheal airway      Successful airway: ETT  Cuffed: yes   Successful intubation technique: direct laryngoscopy  Facilitating devices/methods: intubating stylet  Endotracheal tube insertion site: oral  Blade: Zeyad  Blade size: #4  ETT size (mm): 7.0  Cormack-Lehane Classification: grade IIb - view of arytenoids or posterior of glottis only  Placement verified by: chest auscultation and capnometry   Measured from: teeth  ETT to teeth (cm): 22  Number of attempts at approach: 1  Ventilation between attempts: none  Number of other approaches attempted: 0

## 2024-11-18 NOTE — DISCHARGE INSTRUCTIONS
Shoulder and Elbow Service  Nehemias Leach MD    Discharge Instructions after Arthroscopic Shoulder Repair    Surgical Dressing: You have 2 clear dressing with gauze underneath in the front and back of your shoulder. This needs to stay dry for 3 days. You may remove your dressing after three days and leave open to air. There will be sutures in place. Do not use any aerosol deodorants or lotions on or near surgical incision(s). You may shower 4 days after surgery. The incision(s) CANNOT get wet prior to 4 days. Simply allow the water to wash over the site and then pat dry. Do not rub the incision(s). PLEASE KEEP THE BIG BROWN BANDAGE ON THE SIDE OF YOUR SHOULDER ON UNTIL FOLLOW UP IN 2 WEEKS. THE BIG DRESSING IS WATERPROOF    Activity: Remain in your sling at all times. This includes sleeping in your sling. You should come out of the sling a 4-5x times a day to work on elbow range of motion. OK to use wrist and fingers for light activities. Do not actively move your shoulder during this time. Active reaching and lifting away from the body are not permitted. You may use the operative arm for activities of daily living that do not require the operative arm to leave the side of the body. Such as: eating, drinking and bathing. Remain non-weight bearing with the operative arm until you are seen post operatively.     Pain: Pain medication has been prescribed for you. You will likely need the strong, narcotic pain medicine (usually oxycodone) for the first 72 hours. If pain is severe in the first few days, it is OK to take the oxycodone as 2 tablets every 4 hours as needed. Otherwise, take 1 tablet every 4-6 hours. You should take the extra-strength tylenol with the oxycodone, and after a few days you should be only taking the tylenol. Use cryotherapy machine or ice on the shoulder for the first 2 weeks following surgery.    Other medications: OK to resume all other home medications the day after surgery, unless you were  told otherwise. Avoid taking anti-inflammatory pain medications (Advil, Motrin, Ibuprofen, Aleve, Naproxen or Naprosyn) for 2 weeks after surgery. You will also be given anti-nausea medicine (Zofran) and anti-constipation medicine (docusate) and can use these as needed.     Blood clot prevention: Aspirin has been prescribed to prevent blood clots. Take one aspirin (81 mg) tablet once per day for 2 weeks after surgery, unless you have an aspirin sensitivity or allergy, asthma or are on blood thinners. If Coumadin, Plavix, Xarelto or other blood thinning medication is prescribed for blood clot prevention, take this medication as directed by your medical clearance physician.     Sleeping: After shoulder surgery, it is often uncomfortable to sleep on your back or side. Recommended sleeping positions are in a recliner or in bed with a ramp pillow or multiple pillows under your back.     Swelling: Swelling around the shoulder that travels to the elbow/hand/fingers is normal after shoulder surgery. Doing your elbow/wrist/finger exercises will help reduce this.     Please call the office at [phone] for any problems. Including the following:  - Excessive redness of the incision  - ANY drainage at or around incision  - Fever of more than 101.5 F    A postoperative follow up appointment will be made for you. Please call [phone] with questions. You should see Dr Leach 10-14 days after your surgical procedure.

## 2024-11-18 NOTE — ANESTHESIA PREPROCEDURE EVALUATION
Patient: Alber Reese    Procedure Information       Date/Time: 11/18/24 0930    Procedure: Left Shoulder Arthroscopy; Rotator Cuff Repair (Left: Shoulder) - Pre-op block    Location: [facility] A OR 17 / [facility] A OR    Surgeons: Nehemias Leach MD                                                         Pre- Anesthesia Evaluation                                            Alber Reese is a 59 y.o. male who presents for the above mentioned procedure due to History of failed repair of rotator cuff [Z98.890];Traumatic complete tear of left rotator cuff, subsequent encounter [S46.155D]    Past Medical History:   Diagnosis Date    Bleb, lung (Multi)     s/p resection in 2000    Chronic hypoxic respiratory failure, on home oxygen therapy (Multi) 11/18/2024    Cigarette smoker     COPD (chronic obstructive pulmonary disease) (Multi)     Emphysema lung (Multi)     pulmonary, home O2    H/O alcohol abuse     Lung nodule     Nocturnal hypoxemia     Wears O2 QHS and during day 89% RA, 92% on 2LNC    SCOTT (obstructive sleep apnea) 10/27/2023    Requires continuous at home supplemental oxygen     2.5 L QHS     Past Surgical History:   Procedure Laterality Date    BRONCHOSCOPY  09/2022    CARDIOVASCULAR STRESS TEST  10/14/2022    IMPRESSION: 1. There is no evidence of ischemia or prior infarction. 2. The left ventricle is normal in size. 3. Normal LV wall motion with stress LV EF estimated at 52%.    CT GUIDED PERCUTANEOUS BIOPSY LUNG  11/10/2022    CT GUIDED PERCUTANEOUS BIOPSY LUNG 11/10/2022 PAR [facility]    ECHOCARDIOGRAM 2 D M MODE PANEL  05/27/2021    CONCLUSIONS:  1. The left ventricular systolic function is normal with a 55-60% estimated ejection fraction.  2. The left atrium is moderate to severely dilated.    HERNIA REPAIR      inguinal    KNEE SURGERY Right 1994    LUNG REMOVAL, PARTIAL  2000    blebs s/p resection     Social History   He reports that he has been smoking cigarettes. He has a 40 pack-year smoking history.  He has never used smokeless tobacco. He reports current alcohol use of about 6.0 standard drinks of alcohol per week. He reports that he does not currently use drugs.    Allergies and Medications   No Known Allergies       Current Outpatient Medications   Medication Instructions    acetaminophen (TYLENOL) 500 mg, oral, Every 6 hours PRN    albuterol 2.5 mg, nebulization, 4 times daily PRN    diazePAM (VALIUM) 2 mg, Daily PRN    docusate sodium (COLACE) 100 mg, oral, 2 times daily    fluticasone propion-salmeteroL (Advair Diskus) 500-50 mcg/dose diskus inhaler 1 puff, inhalation, 2 times daily RT, Rinse mouth with water after use to reduce aftertaste and incidence of candidiasis. Do not swallow.     mg tablet 1 tablet, 3 times daily (morning, midday, late afternoon)    montelukast (SINGULAIR) 10 mg, oral, Nightly, STOP IF MOOD CHANGES.    nicotine polacrilex (NICORETTE) 2 mg, Mouth/Throat, As needed    nitroglycerin (Nitrostat) 0.4 mg SL tablet DISSOLVE ONE TABLET UNDER TONGUE every 5 minutes for up to 3 doses as needed for chest pain. call 911 if pain persists.    oxyCODONE-acetaminophen (Percocet) 5-325 mg tablet 1 tablet, Every 6 hours PRN    Spiriva with HandiHaler 18 mcg, inhalation, Daily RT, GO TO ER IF VISUAL/EYE PAIN OR URINARY RETENTION OR PALPITATIONS OR CHEST PAIN OR    Ventolin HFA 90 mcg/actuation inhaler INHALE 1 TO 2 PUFFS BY MOUTH EVERY 4 TO 6 HOURS AS NEEDED FOR WHEEZING OR SHORTNESS OF BREATH       Recent Labs     11/07/24  1428 02/29/24  1448   WBC 8.4 9.9   HGB 14.2 14.9   HCT 43.5 46.0    398   MCV 98 95     Recent Labs     11/10/22  0916 02/15/22  2215   PROTIME 11.0 11.7   INR 1.0 1.0     Recent Labs     11/07/24  1428 02/29/24  1448 02/15/22  2215   EGFR >90 >90  --    ANIONGAP 10 10 13   BUN 16 12 11   CREATININE 0.75 0.74 0.81    135* 136   K 4.4 5.4* 4.1    100 101   CO2 27 30 26   GLUCOSE 92 109* 94     Recent Labs     02/29/24  1448 02/15/22  2215   PROT 7.3 7.1    ALBUMIN 4.0 4.4   BILITOT 0.3 0.4   ALKPHOS 116 94   ALT 13 11   AST 21 14       Recent Labs     08/27/20  1358   TSH 0.72     Recent Labs     11/07/24  1428 02/29/24  1448   CALCIUM 8.9 10.1       Lab Results   Component Value Date    MODESTA (A) 02/29/2024     Isolated: Methicillin Susceptible Staphylococcus aureus (MSSA)         Encounter Date: 11/07/24   ECG 12 Lead   Result Value    Ventricular Rate 73    Atrial Rate 73    MO Interval 138    QRS Duration 88    QT Interval 406    QTC Calculation(Bazett) 447    P Axis 80    R Axis 72    T Axis 74    QRS Count 12    Q Onset 222    P Onset 153    P Offset 199    T Offset 425    QTC Fredericia 433    Narrative    Normal sinus rhythm  Minimal voltage criteria for LVH, may be normal variant  Borderline ECG  When compared with ECG of 29-FEB-2024 14:23,  No significant change was found  Confirmed by Larry Dunn ([id number]) on 11/8/2024 5:59:33 PM        NM cardiac stress rest (myocardial perfusion MIBI) 10/14/2022  Both stress and rest studies demonstrate grossly normal perfusion  throughout the left ventricle.    The left ventricle is normal in size.    Gated images demonstrate normal LV wall motion with stress LV EF  estimated at 52%.    Impression  1. There is no evidence of ischemia or prior infarction.  2. The left ventricle is normal in size.  3. Normal LV wall motion with stress LV EF estimated at 52%.    NM PET CT LUNG CA  INITIAL DIAGNOSIS 6/21/24  Redemonstration of the thick-walled cavities of the right upper lobe  exhibiting overall mild metabolic activity with more focal areas of  further mild increased metabolic activity along its superolateral  aspect and inferolateral aspect. Overall, findings are decreased in  comparison the prior PET study. Findings are again suggestive of an  inflammatory/infectious process. Malignant involvement is felt to be  less likely, but cannot be definitively excluded. If there is a high  clinical suspicion for a neoplastic  "process, tissue sampling may be  of value with attention to either the superolateral aspect or  inferior medial aspect. Localizing PET images were sent to PACS.    No other definite areas of abnormal hypermetabolic tracer activity to  suggest an active neoplastic/metastatic process are identified.    CT CHEST WO IV CONTRAST 9/19/24  1.  Some progression of wall thickening at probable blebs and bulla  at the right upper lung, likely due to infection such as  aspergillosis or fungus. A cavitary malignant lesion is not excluded.    Visit Vitals  /79   Pulse 74   Temp 36.4 °C (97.5 °F) (Temporal)   Resp 22   Ht 1.651 m (5' 5\")   Wt 56 kg (123 lb 7.3 oz)   SpO2 94%   BMI 20.54 kg/m²   Smoking Status Some Days   BSA 1.6 m²     Medical Gas Therapy: None (Room air)            11/18/2024     8:12 AM 11/8/2024     9:27 AM 11/7/2024     1:54 PM 7/26/2024     9:38 AM 3/11/2024    10:45 AM 3/11/2024    10:44 AM 3/11/2024    10:30 AM   BP REVIEW   BP (ultimate) 127/79 152/83 136/73 158/89 134/79 134/79 132/79              Relevant Problems   Cardiac   (+) Chest pain      Pulmonary   (+) COPD (chronic obstructive pulmonary disease) (Multi)   (+) Dyspnea on exertion   (+) SCOTT (obstructive sleep apnea)      Neuro   (+) Sciatica of left side      Musculoskeletal   (+) Tear of left rotator cuff      Respiratory   (+) Chronic hypoxic respiratory failure, on home oxygen therapy (Multi)   (+) Nocturnal hypoxemia      Mental Health   (+) H/O alcohol abuse       Clinical information reviewed:   Tobacco  Allergies  Meds   Med Hx  Surg Hx   Fam Hx  Soc Hx        NPO Detail:  NPO/Void Status  Date of Last Liquid: 11/18/24  Time of Last Liquid: 0530  Date of Last Solid: 11/17/24  Time of Last Solid: 2230         Physical Exam    Airway  Mallampati: II  TM distance: >3 FB     Cardiovascular   Rhythm: regular  Rate: normal     Dental   Comments: Poor dentition, chipped upper incisors   Pulmonary   Breath sounds clear to " auscultation     Abdominal      Other findings: Anxious          Anesthesia Plan    History of general anesthesia?: yes  History of complications of general anesthesia?: no    ASA 4     general   (General with ETT. Nerve block requested by surgeon for post -op analgesia. Patient has tolerated interscalene block within the past year for shoulder surgery. Standard ASA monitoring.)  The patient is a current smoker.  Patient was previously instructed to abstain from smoking on day of procedure.  Education provided regarding risk of obstructive sleep apnea.  intravenous induction   Postoperative administration of opioids is intended.  Anesthetic plan and risks discussed with patient.    Plan discussed with CAA and CRNA.

## 2024-11-18 NOTE — PERIOPERATIVE NURSING NOTE
1305 Patient arrived to [facility] after procedure with Anesthesia and procedure RN, procedure discussed, plan reviewed, VSS    1307 Simple mask removed patient on room air    1315 patient on 4L NC    1330 Meds to beds contacted, patient on room air     1332 patient family updated via secure text message     1402 Meds to beds at bedside

## 2024-11-18 NOTE — ANESTHESIA POSTPROCEDURE EVALUATION
Patient: Alber Reese    Procedure Summary       Date: 11/18/24 Room / Location: [facility] A OR 18 / [facility] OR    Anesthesia Start: 1055 Anesthesia Stop: 1306    Procedure: Left Shoulder Arthroscopy; Revision of Rotator Cuff Repair (Left: Shoulder) Diagnosis:       History of failed repair of rotator cuff      Traumatic complete tear of left rotator cuff, subsequent encounter      (History of failed repair of rotator cuff [Z98.890])      (Traumatic complete tear of left rotator cuff, subsequent encounter [S46.012D])    Surgeons: Nehemias Leach MD Responsible Provider: Katy Forde MD    Anesthesia Type: general ASA Status: 4            Anesthesia Type: general    Vitals Value Taken Time   /79 11/18/24 1401   Temp 36.4 °C (97.5 °F) 11/18/24 1305   Pulse 78 11/18/24 1403   Resp 19 11/18/24 1403   SpO2 92 % 11/18/24 1403   Vitals shown include unfiled device data.    Anesthesia Post Evaluation    Patient location during evaluation: PACU  Patient participation: complete - patient participated  Level of consciousness: awake  Pain management: adequate  Multimodal analgesia pain management approach  Airway patency: patent  Cardiovascular status: hemodynamically stable  Respiratory status: spontaneous ventilation  Hydration status: euvolemic  Postoperative Nausea and Vomiting: none        There were no known notable events for this encounter.

## 2024-11-18 NOTE — OP NOTE
Left Shoulder Arthroscopy; Revision of Rotator Cuff Repair (L) Operative Note     Date: 2024  OR Location: [facility] A OR    Name: Alber Reese, : 1965, Age: 59 y.o., MRN: [id number], Sex: male    Diagnosis  Pre-op Diagnosis      * History of failed repair of rotator cuff [Z98.890]     * Traumatic complete tear of left rotator cuff, subsequent encounter [S46.012D] Post-op Diagnosis     * History of failed repair of rotator cuff [Z98.890]     * Traumatic complete tear of left rotator cuff, subsequent encounter [S46.012D]     Procedures  Left Shoulder Arthroscopy; Revision of Rotator Cuff Repair  29192 - DC SURGICAL ARTHROSCOPY SHOULDER W/ROTATOR CUFF RPR    DC SURGICAL ARTHROSCOPY SHOULDER XTNSV DBRDMT 3+ [14033]  Surgeons      * Nehemias Leach - Primary    Resident/Fellow/Other Assistant:  Surgeons and Role:     * Pricilla Daley MD - Resident - Assisting    Staff:   Circulator: Nallely Forrestub Person: Chitra Forrestub Person: Bahman Monteiro Circulator: Malka    Anesthesia Staff: Anesthesiologist: Katy Forde MD  CRNA: HELENE Ram-CRNA  C-AA: PERICO Head  SRNA: Gilbert Centeno    Procedure Summary  Anesthesia: General  ASA: IV  Estimated Blood Loss: 10 mL  Intra-op Medications:   Administrations occurring from 1051 to 1251 on 24:   Medication Name Total Dose   lactated Ringer's irrigation solution 3,000 mL   albumin human 5 % 250 mL   ceFAZolin (Ancef) 1 g 2 g   dexAMETHasone (Decadron) 4 mg/mL 8 mg   dexmedeTOMIDine (Precedex) bolus from bag 6 mcg   glycopyrrolate PF (Robinul) injection 0.4 mg   ketamine injection 50 mg/ 5 mL (10 mg/mL) 50 mg   ketorolac (Toradol) injection 30 mg 30 mg   LR bolus Cannot be calculated   lidocaine (LTA Kit) for intubation 4 mL   lidocaine PF (Xylocaine-MPF) local injection 2 % 50 mg   ondansetron 2 mg/mL 4 mg   phenylephrine 100 mcg/mL syringe 10 mL (prefilled) 2,000 mcg   propofol (Diprivan) injection 10 mg/mL 150 mg   rocuronium (ZeMuron) 50 mg/5 mL  injection 50 mg   sugammadex (Bridion) 200 mg/2 mL injection 200 mg              Anesthesia Record               Intraprocedure I/O Totals          Intake    Dexmedetomidine 0.00 mL    The total shown is the total volume documented since Anesthesia Start was filed.    LR bolus 1000.00 mL    albumin human 5 % 250.00 mL    Total Intake 1250 mL       Output    Est. Blood Loss 10 mL    Total Output 10 mL       Net    Net Volume 1240 mL          Specimen: No specimens collected              Drains and/or Catheters: * None in log *    Tourniquet Times:         Implants:  Implants       Type Name Action Serial No.      Implant ANCHORS, TENDON 8 - SNA - [id number] Implanted NA     Implant BONE ANCHORS 3, WITH ARTHRO DELIVERY SYS ADVANCED - SNA - [id number] Implanted NA     Joint Shoulder KNOTLESS 2.6 FIBERTAK Implanted NA     Implant DELIVERY SYSTEM, ARTHROSCOPIC, BIO INDUCTIVE, MEDIUM - [facility] - [id number] Implanted NA               INDICATIONS FOR PROCEDURE: The patient had significant pain and weakness in the affected extremity which was refractory to nonoperative measures and was diagnosed with recurrent rotator cuff tear of the left shoulder.  The patient had a previous rotator cuff repair, but unfortunately fell and suffered a retear.  Treatment options were discussed. The patient was proposed to have a shoulder arthroscopy and related procedures based on preoperative planning and intraoperative findings. Risks and benefits of surgery and alternatives of treatment were discussed.  The patient understood all the risks and benefits and wanted to proceed with surgical option.    DESCRIPTION OF PROCEDURE: Patient was met in the preoperative holding area. Consent for surgery was reviewed and the correct operative extremity was verified and marked. The patient then underwent a preoperative  nerve block, please see anesthesia records regarding this. The patient was then brought to the operating room and was placed in a supine  position on the operating table.  SCDs were placed for DVT prophylaxis. General anesthesia was induced. The patient was placed in a beach chair position to around 90 degrees of inclination, and all the bony prominences were well padded. The operative upper extremity was prepped and draped in usual sterile fashion.  A time out was held confirming the correct patient, operative side, operative procedure, preoperative antibiotics, implants being present and that it was safe to proceed--all were in agreement it was safe to proceed.    Exam under anesthesia: Prior to incision, an EUA showed: full passive ROM    Diagnostic shoulder arthroscopy of the glenohumeral joint: A standard posterolateral arthroscopic portal was made approximately 2 cm medial and inferior to the posterolateral border of the acromion, at the soft spot of the glenohumeral joint. The arthroscope was introduced into the glenohumeral joint. Under arthroscopic visualization, an anterior portal was made in the rotator interval. This was made by first visualizing the trajectory with a spinal needle and then utilizing a cannula from outside to inside to create this anterior portal along a similar path in the same trajectory through a small skin incision.    Diagnostic arthroscopy revealed:   Biceps tendon: Not visualized, healed within the bicipital groove  Subscapularis: Upper border subscapularis tear healed  Anterior capsule: Synovitis  Glenoid cartilage: Mild delamination  Humeral head cartilage: Mild delamination  Anterior labrum: Significant fraying  Superior labrum: Significant fraying  Posterior labrum: Intact  Axillary recess: No loose bodies  Inferior glenohumeral ligaments: Intact  Posterosuperior rotator cuff: The majority of the rotator cuff previous repair had healed with sutures in place, however there was a section at the junction of the supraspinatus and infraspinatus that did have full-thickness tearing    A debridement was then performed  on anterior capsule, anterior labrum, superior labrum, glenoid cartilage, humeral head cartilage.    Subacromial bursectomy: Next, the arthroscope was introduced into the subacromial space. There was extensive synovitis and bursitis in this region with associated scarring. An extensive bursectomy and synovectomy was performed in the subacromial space using a combination of 4.5 mm shaver and the RF device through a lateral portal.     Rotator cuff repair: Attention was then turned to repair of the rotator cuff tears. There was a small tear of the border of the supraspinatus and infraspinatus tendons. In order to fully mobilize the tendons, soft tissue releases were performed anteriorly, superiorly, and inferiorly, essentially circumferentially around the tendons.  There was significant synovitis and adhesions secondary to the previous rotator cuff repair did take extensive time to provide visualization.  We then repaired the tear using a Arthrex knotless all suture anchor anchored into the lateral greater tuberosity.  2 sutures were passed at the tear and shuttled back through the suture body which was self tensioning.  Satisfactory rotator repair was formed.  Given that status of the patient's tissue, as well as this being a revision repair, we decided to augment the repair using a bio inductive implant.  Due to the lateral overhang of the acromion as well as the size of the shoulder, decision was made to do this in an open fashion to facilitate accurate placement of the bio inductive implant.    A longitudinal incision was made in line with the lateral portal with care taken to preserve the axillary nerve.  Subcutaneous tissue and skin were incised using knife electrocautery.  The deltoid fascia was incised and the deltoid muscle was split.  The underlying rotator cuff was then seen.  The area of poor tissue quality was identified.  A medium Regeneten implant was chosen and placed through the lateral portal.  Using  a separate cannula, we then used the JESUS anchors to secure the Regeneten implant to the rotator cuff soft tissue.  Laterally, we used 2 peek bone anchors to secure the implant to the greater tuberosity.  This completed the repair of the partial rotator cuff tear using a bioinductive implant to connect the rotator cuff to the greater tuberosity bone using anchors.  Excellent fixation was achieved.  A total of 5 anchors were used.  With range of motion of the shoulder, the patch was stable with internal and external rotation.  This achieved augmentation of rotator cuff repair with a bioinductive implant.      We then inspected our repair and felt that we had adequately repaired the rotator cuff onto its footprint on the greater tuberosity and that the rotator cuff was not under excessive tension in this location.    Closure of incisions:  The open incision was closed using 0 Vicryl for the deltoid fascia and the deep subcutaneous tissue, 2-0 Vicryl for the deep dermal layer, 3-0 Monocryl for the skin, skin glue, and a Mepilex dressing.      The portals were reapproximated and closed with 3-0 nylon suture. We confirmed that all counts were correct at the end of the case prior to and following closure. A clean sterile dressing consisting of xeroform, fluffs, ABD pad, and foam tape was applied. The drapes were then taken down. The patient was then placed into a sling with an abduction pillow prior to being awoken from anesthesia.    Following the procedure, the patient was satisfactorily awakened from anesthesia and transferred to the postanesthesia care unit in stable condition.     22 modifier: This was a revision rotator cuff repair that required an open incision in order to place a augmentation patch.  This did  require significantly increased work compared to a normal rotator cuff repair, therefore a 22 modifier is justified.    ATTESTATION: Dr. Leach was present for the entirety of the procedure and personally  performed all aspects of this procedure.    Dragon software was used to dictate this note, please be aware that minor errors in transcription may be present.    Nehemias Leach MD    Shoulder/Elbow Surgery  [facility] [place]        Complications:  None; patient tolerated the procedure well.    Disposition: PACU - hemodynamically stable.  Condition: stable       Attending Attestation: I was present and scrubbed for the entire procedure.    Nehemias Leach  Phone Number: [phone]

## 2024-11-19 DIAGNOSIS — M75.100 TEAR OF ROTATOR CUFF, UNSPECIFIED LATERALITY, UNSPECIFIED TEAR EXTENT, UNSPECIFIED WHETHER TRAUMATIC: Primary | ICD-10-CM

## 2024-11-25 DIAGNOSIS — M75.122 COMPLETE TEAR OF LEFT ROTATOR CUFF, UNSPECIFIED WHETHER TRAUMATIC: ICD-10-CM

## 2024-11-25 DIAGNOSIS — Z98.890 HISTORY OF REPAIR OF LEFT ROTATOR CUFF: Primary | ICD-10-CM

## 2024-11-25 DIAGNOSIS — Z98.890 HISTORY OF FAILED REPAIR OF ROTATOR CUFF: ICD-10-CM

## 2024-11-25 RX ORDER — OXYCODONE AND ACETAMINOPHEN 5; 325 MG/1; MG/1
1 TABLET ORAL EVERY 6 HOURS PRN
Qty: 28 TABLET | Refills: 0 | Status: SHIPPED | OUTPATIENT
Start: 2024-11-25 | End: 2024-11-26 | Stop reason: SDUPTHER

## 2024-11-26 ENCOUNTER — PREP FOR PROCEDURE (OUTPATIENT)
Dept: ORTHOPEDIC SURGERY | Facility: HOSPITAL | Age: 59
End: 2024-11-26
Payer: MEDICARE

## 2024-11-26 DIAGNOSIS — S46.012D TRAUMATIC COMPLETE TEAR OF LEFT ROTATOR CUFF, SUBSEQUENT ENCOUNTER: ICD-10-CM

## 2024-11-26 DIAGNOSIS — Z98.890 HISTORY OF REPAIR OF LEFT ROTATOR CUFF: Primary | ICD-10-CM

## 2024-11-26 DIAGNOSIS — Z98.890 HISTORY OF FAILED REPAIR OF ROTATOR CUFF: ICD-10-CM

## 2024-11-26 RX ORDER — OXYCODONE AND ACETAMINOPHEN 5; 325 MG/1; MG/1
1 TABLET ORAL EVERY 4 HOURS PRN
Qty: 28 TABLET | Refills: 0 | Status: SHIPPED | OUTPATIENT
Start: 2024-11-26 | End: 2024-12-03

## 2024-12-04 ENCOUNTER — OFFICE VISIT (OUTPATIENT)
Dept: ORTHOPEDIC SURGERY | Facility: CLINIC | Age: 59
End: 2024-12-04
Payer: MEDICARE

## 2024-12-04 DIAGNOSIS — Z98.890 HISTORY OF FAILED REPAIR OF ROTATOR CUFF: ICD-10-CM

## 2024-12-04 DIAGNOSIS — S46.012D TRAUMATIC COMPLETE TEAR OF LEFT ROTATOR CUFF, SUBSEQUENT ENCOUNTER: Primary | ICD-10-CM

## 2024-12-04 PROCEDURE — 99211 OFF/OP EST MAY X REQ PHY/QHP: CPT | Performed by: STUDENT IN AN ORGANIZED HEALTH CARE EDUCATION/TRAINING PROGRAM

## 2024-12-04 RX ORDER — OXYCODONE AND ACETAMINOPHEN 5; 325 MG/1; MG/1
1 TABLET ORAL EVERY 4 HOURS PRN
Qty: 28 TABLET | Refills: 0 | Status: SHIPPED | OUTPATIENT
Start: 2024-12-04 | End: 2024-12-11

## 2024-12-04 RX ORDER — CEPHALEXIN 500 MG/1
500 CAPSULE ORAL 2 TIMES DAILY
Qty: 20 CAPSULE | Refills: 0 | Status: SHIPPED | OUTPATIENT
Start: 2024-12-04 | End: 2024-12-14

## 2024-12-04 NOTE — PROGRESS NOTES
History: Patient returns to the office status post L revision RCR with bioinductive patch on 2 weeks ago. Patient has been immobilized in a sling and pain is well controlled. Denies numbness/tingling.     Past medical history, past surgical history, medications, allergies, family history, social history, and review of systems were reviewed today and have been documented separately in this encounter.   A 12 point review of systems was negative other than as stated in the HPI.    Physical Examination:    On exam of the left upper extremity, incisions are well healed, without significant erythema or drainage, sutures were removed today. Anterior portal wound with some dehiscence.  Demonstrates full ROM of the elbow/wrist/hand. Neurovascularly intact throughout.    Assessment: 2 weeks s/p  L revision RCR     Plan: At this point we will start stretches and PT. We will also place him on Keflex for his wound. All questions answered.     I have personally reviewed the OARRS report for this patient. I have considered the risks of abuse, dependence, addiction, and diversion. Prescription medication at this time is appropriate given the patients recent severity of injury/surgery.       Dragon software was used to dictate this note, please be aware that minor errors in transcription may be present.    Nehemias Leach MD    Shoulder/Elbow Surgery  [facility]

## 2024-12-05 DIAGNOSIS — G47.34 NOCTURNAL HYPOXIA: Primary | ICD-10-CM

## 2024-12-11 ENCOUNTER — EVALUATION (OUTPATIENT)
Dept: PHYSICAL THERAPY | Facility: CLINIC | Age: 59
End: 2024-12-11
Payer: MEDICARE

## 2024-12-11 DIAGNOSIS — M25.512 LEFT SHOULDER PAIN: Primary | ICD-10-CM

## 2024-12-11 DIAGNOSIS — S46.012D TRAUMATIC COMPLETE TEAR OF LEFT ROTATOR CUFF, SUBSEQUENT ENCOUNTER: ICD-10-CM

## 2024-12-11 PROCEDURE — 97140 MANUAL THERAPY 1/> REGIONS: CPT | Mod: GP

## 2024-12-11 PROCEDURE — 97162 PT EVAL MOD COMPLEX 30 MIN: CPT | Mod: GP

## 2024-12-11 ASSESSMENT — ENCOUNTER SYMPTOMS
DEPRESSION: 0
LOSS OF SENSATION IN FEET: 0
OCCASIONAL FEELINGS OF UNSTEADINESS: 0

## 2024-12-11 ASSESSMENT — PAIN SCALES - GENERAL: PAINLEVEL_OUTOF10: 8

## 2024-12-11 ASSESSMENT — PAIN - FUNCTIONAL ASSESSMENT: PAIN_FUNCTIONAL_ASSESSMENT: 0-10

## 2024-12-11 NOTE — PROGRESS NOTES
Physical Therapy    Physical Therapy Evaluation    Patient Name: Alber Reese  MRN: [id number]  Today's Date: 12/11/2024    Time Entry:  Time Calculation  Start Time: 1515  Stop Time: 1545  Time Calculation (min): 30 min  PT Evaluation Time Entry  PT Evaluation (Moderate) Time Entry: 15  PT Therapeutic Procedures Time Entry  Manual Therapy Time Entry: 15                 Visit #1  Insurance; Medicare  Cert; 12/11/2024 - 3/7/2025  Problem List Items Addressed This Visit             ICD-10-CM       Musculoskeletal and Injuries    Left shoulder pain - Primary M25.512    Relevant Orders    Follow Up In Physical Therapy    Tear of left rotator cuff M75.102    Relevant Orders    Follow Up In Physical Therapy       Assessment  Patient is recovering from revision left RC repair, which was completed on 11/18/2024. Patient is in sling. Patient reported falling of latter and re taring RC after original RC repair earlier this year. Patient states pain was moderate 4-5/10 since surgery until he woke up from violet dream last night and found himself lifting both arm up above himself to prevent falling in dream. He states pain he felt was intense and he is not sure if its safe to do much with left shoulder in terms of stretching, but he is scheduled to see his surgeon next week, on 11/18/2024. POC developed and discussed. Patient understanding and motivated, opting to rest at home, take medications and ice as needed until next week.     Plan  Treatment/Interventions: Education/ Instruction, Manual therapy, Taping techniques, Therapeutic exercises  PT Plan: Skilled PT  PT Frequency:  (1-2 times per week x 16 sessions)  Certification Period Start Date: 12/11/24  Certification Period End Date: 03/07/25  Rehab Potential: Good  Plan of Care Agreement: Patient    Current Problem  1. Left shoulder pain  Follow Up In Physical Therapy      2. Traumatic complete tear of left rotator cuff, subsequent encounter  Referral to Physical Therapy     Follow Up In Physical Therapy          Subjective   General:  General  Reason for Referral: PT eval and treat; left shoulder s/p arthroscopic RC revision repair  Referred By: Nehemias Leon  Past Medical History Relevant to Rehab: RC revision-repair 11/18/2024 (Arm in sling since procedure)  General Comment: Woke up in my dream screaming and outstretched both of may arms causing lots of pain to my left shoulder. Arm is sore but okay and I keep it in sling per protocol. Did not have chance to report to ortho yet  Precautions: discussed post op precautions following RC repair and revision   Precautions  STEADI Fall Risk Score (The score of 4 or more indicates an increased risk of falling): 0  Pain:  Pain Assessment: 0-10  0-10 (Numeric) Pain Score: 8  Pain Type: Acute pain  Pain Location:  (left shoulder)  Pain Orientation: Left  Pain Radiating Towards: pain localized to shoulder  Pain Descriptors:  (intense dull ache)  Pain Frequency: Constant/continuous  Pain Onset: Gradual  Clinical Progression:  (Pain was improving until I woke up in my dream and raised both arms up to defent myself from falling. It woke me wide awake. Not sure how far I raised my arm)  Effect of Pain on Daily Activities: everything effect right now  Patient's Stated Pain Goal:  (Reduce pain and improve arm motion and use)  Pain Interventions:  (Percocets and Tylenol are available)  Response to Interventions:  (Managing...)  Home Living: NA     Prior Function Per Pt/Caregiver Report: NA     Objective   Posture: WNL     Range of Motion: left shoulder PROM  Flexion 0-80  Abduction 0-40  ER NT  IR to both  Full elbow extension and flexion     Strength: NA at this time     Flexibility: NA     Palpation: left shoulder tender/sore to touch      Special Tests: NA     Gait: WNL     Balance: WNL    Outcome Measures:  Quick Dash =  81.8%    OP EDUCATION:  Outpatient Education  Individual(s) Educated: Patient  Education Provided: Anatomy, Body Mechanics,  Home Exercise Program, POC, Post-Op Precautions  Risk and Benefits Discussed with Patient/Caregiver/Other: yes  Patient/Caregiver Demonstrated Understanding: yes  Plan of Care Discussed and Agreed Upon: yes  Patient Response to Education: Patient/Caregiver Verbalized Understanding of Information  Education Comment: POC and HEP and rehab protocol configuration    PT intervention;    PROM to left shoulder  STM to left shoulder x 15 minutes  Goals:  Active       PT Problem       PT Goal 1       Start:  12/11/24    Expected End:  03/07/25       Use of left shoulder and quality of arm application with common daily tasks will improve, as evident by QuickDash score reduction by 50%          PT Goal 2       Start:  12/11/24    Expected End:  03/07/25       Patient will report reduced/abolished pain in left shoulder, indicated by grade of 0-3 on 0-10 pain scale          PT Goal 3       Start:  12/11/24    Expected End:  03/07/25       Patient will demonstrate improved ROM of left shoulder, indicated by goniometric measure of 0-150 flexion, 0-120 abduction, and functional reach behind back to lower thoracic spine          PT Goal 4       Start:  12/11/24    Expected End:  03/07/25       Patient will demo functional strength in left arm, sufficient to reach and lift light weights over head, sleep on left side, push of chairs and bed without experience of shoulder pain and report independence with HEP

## 2024-12-17 DIAGNOSIS — J43.9 PULMONARY EMPHYSEMA, UNSPECIFIED EMPHYSEMA TYPE (MULTI): ICD-10-CM

## 2024-12-17 DIAGNOSIS — Z98.890 HISTORY OF FAILED REPAIR OF ROTATOR CUFF: ICD-10-CM

## 2024-12-17 RX ORDER — OXYCODONE AND ACETAMINOPHEN 5; 325 MG/1; MG/1
1 TABLET ORAL EVERY 4 HOURS PRN
Qty: 28 TABLET | Refills: 0 | Status: SHIPPED | OUTPATIENT
Start: 2024-12-17 | End: 2024-12-24

## 2024-12-23 ENCOUNTER — DOCUMENTATION (OUTPATIENT)
Dept: PHYSICAL THERAPY | Facility: CLINIC | Age: 59
End: 2024-12-23
Payer: MEDICARE

## 2024-12-23 ENCOUNTER — APPOINTMENT (OUTPATIENT)
Dept: PHYSICAL THERAPY | Facility: CLINIC | Age: 59
End: 2024-12-23
Payer: MEDICARE

## 2024-12-23 NOTE — PROGRESS NOTES
Physical Therapy                 Therapy Communication Note    Patient Name: Alber Reese  MRN: [id number]  Department:   Room: Room/bed info not found  Today's Date: 12/23/2024     Discipline: Physical Therapy          Missed Visit Reason:  car would not start     Missed Time: Cancel    Comment:

## 2024-12-30 ENCOUNTER — APPOINTMENT (OUTPATIENT)
Dept: PHYSICAL THERAPY | Facility: CLINIC | Age: 59
End: 2024-12-30
Payer: MEDICARE

## 2025-01-08 ENCOUNTER — OFFICE VISIT (OUTPATIENT)
Dept: ORTHOPEDIC SURGERY | Facility: CLINIC | Age: 60
End: 2025-01-08
Payer: MEDICARE

## 2025-01-08 ENCOUNTER — APPOINTMENT (OUTPATIENT)
Dept: PHYSICAL THERAPY | Facility: CLINIC | Age: 60
End: 2025-01-08
Payer: MEDICARE

## 2025-01-08 ENCOUNTER — DOCUMENTATION (OUTPATIENT)
Dept: PHYSICAL THERAPY | Facility: CLINIC | Age: 60
End: 2025-01-08

## 2025-01-08 DIAGNOSIS — S46.012D TRAUMATIC COMPLETE TEAR OF LEFT ROTATOR CUFF, SUBSEQUENT ENCOUNTER: Primary | ICD-10-CM

## 2025-01-08 PROCEDURE — 99211 OFF/OP EST MAY X REQ PHY/QHP: CPT | Performed by: STUDENT IN AN ORGANIZED HEALTH CARE EDUCATION/TRAINING PROGRAM

## 2025-01-08 RX ORDER — OXYCODONE HYDROCHLORIDE 5 MG/1
5 TABLET ORAL EVERY 6 HOURS PRN
Qty: 25 TABLET | Refills: 0 | Status: SHIPPED | OUTPATIENT
Start: 2025-01-08 | End: 2025-01-09 | Stop reason: ENTERED-IN-ERROR

## 2025-01-08 NOTE — PROGRESS NOTES
Physical Therapy                 Therapy Communication Note    Patient Name: Alber Reese  MRN: [id number]  Department:   Room: Room/bed info not found  Today's Date: 1/8/2025     Discipline: Physical Therapy          Missed Visit Reason: unable to see in order to drive at night, will attend the rest of visits, they are all scheduled for 1 pm    Missed Time: Cancel    Comment:  
room air

## 2025-01-08 NOTE — PROGRESS NOTES
History: Patient returns to the office status post L revision RCR on 6 weeks ago. Patient has been immobilized in a sling and pain is well controlled. Denies numbness/tingling.     Past medical history, past surgical history, medications, allergies, family history, social history, and review of systems were reviewed today and have been documented separately in this encounter.   A 12 point review of systems was negative other than as stated in the HPI.    Physical Examination:    On exam of the left upper extremity, incisions are well healed, without significant erythema or drainage. Demonstrates full ROM of the elbow/wrist/hand. Neurovascularly intact throughout. Passive ROM of the operative shoulder , ER 30.     Assessment: 6 weeks s/p L revision RCR     Plan: At this point continue PT. Incisions look well healed. Follow up in 2 months.     Dragon software was used to dictate this note, please be aware that minor errors in transcription may be present.    Nehemias Leach MD    Shoulder/Elbow Surgery  [facility] [place]

## 2025-01-09 RX ORDER — OXYCODONE AND ACETAMINOPHEN 5; 325 MG/1; MG/1
1 TABLET ORAL EVERY 6 HOURS PRN
Qty: 25 TABLET | Refills: 0 | Status: SHIPPED | OUTPATIENT
Start: 2025-01-09 | End: 2025-01-16

## 2025-01-10 ENCOUNTER — DOCUMENTATION (OUTPATIENT)
Dept: PHYSICAL THERAPY | Facility: CLINIC | Age: 60
End: 2025-01-10
Payer: MEDICARE

## 2025-01-10 NOTE — PROGRESS NOTES
Physical Therapy                 Therapy Communication Note    Patient Name: Alber Reese  MRN: [id number]  Department:   Room: Room/bed info not found  Today's Date: 1/10/2025     Discipline: Physical Therapy          Missed Visit Reason:  no showed, called and spoke with patient, states he canceled the appt but he is sick today, explained our attendance policy and understood, states he will be at next visit.     Missed Time: No Show    Comment:

## 2025-01-13 ENCOUNTER — APPOINTMENT (OUTPATIENT)
Dept: PHYSICAL THERAPY | Facility: CLINIC | Age: 60
End: 2025-01-13
Payer: MEDICARE

## 2025-01-13 ENCOUNTER — DOCUMENTATION (OUTPATIENT)
Dept: PHYSICAL THERAPY | Facility: CLINIC | Age: 60
End: 2025-01-13
Payer: MEDICARE

## 2025-01-13 NOTE — PROGRESS NOTES
Physical Therapy                 Therapy Communication Note    Patient Name: Alber Reese  MRN: [id number]  Department:   Room: Room/bed info not found  Today's Date: 1/13/2025     Discipline: Physical Therapy          Missed Visit Reason:  NA    Missed Time: No Show    Comment: remaining visits may be cancelled in accordance to visits compliance policy. PT attempted reaching patient via phone 2 hours before scheduled visit. VM left

## 2025-01-15 ENCOUNTER — APPOINTMENT (OUTPATIENT)
Dept: PHYSICAL THERAPY | Facility: CLINIC | Age: 60
End: 2025-01-15
Payer: MEDICARE

## 2025-01-21 ENCOUNTER — DOCUMENTATION (OUTPATIENT)
Dept: PHYSICAL THERAPY | Facility: CLINIC | Age: 60
End: 2025-01-21
Payer: MEDICARE

## 2025-01-21 NOTE — PROGRESS NOTES
Physical Therapy                 Therapy Communication Note    Patient Name: Alber Reese  MRN: [id number]  Department:   Room: Room/bed info not found  Today's Date: 1/21/2025     Discipline: Physical Therapy          Missed Visit Reason:  Reasons for not attending last 3 visits not provided, all remaining visits have been cancelled due to poor compliance with scheduled visits and in accordance with rehab attendance policy     Missed Time: No Show    Comment:

## 2025-01-23 ENCOUNTER — APPOINTMENT (OUTPATIENT)
Dept: PHYSICAL THERAPY | Facility: CLINIC | Age: 60
End: 2025-01-23
Payer: MEDICARE

## 2025-01-27 ENCOUNTER — APPOINTMENT (OUTPATIENT)
Dept: PHYSICAL THERAPY | Facility: CLINIC | Age: 60
End: 2025-01-27
Payer: MEDICARE

## 2025-01-29 ENCOUNTER — APPOINTMENT (OUTPATIENT)
Dept: PHYSICAL THERAPY | Facility: CLINIC | Age: 60
End: 2025-01-29
Payer: MEDICARE

## 2025-01-29 DIAGNOSIS — Z98.890 HISTORY OF FAILED REPAIR OF ROTATOR CUFF: Primary | ICD-10-CM

## 2025-01-29 RX ORDER — OXYCODONE AND ACETAMINOPHEN 5; 325 MG/1; MG/1
1 TABLET ORAL EVERY 6 HOURS PRN
Qty: 25 TABLET | Refills: 0 | Status: SHIPPED | OUTPATIENT
Start: 2025-01-29 | End: 2025-02-05

## 2025-02-03 ENCOUNTER — APPOINTMENT (OUTPATIENT)
Dept: PHYSICAL THERAPY | Facility: CLINIC | Age: 60
End: 2025-02-03
Payer: MEDICARE

## 2025-02-05 ENCOUNTER — APPOINTMENT (OUTPATIENT)
Dept: PHYSICAL THERAPY | Facility: CLINIC | Age: 60
End: 2025-02-05
Payer: MEDICARE

## 2025-02-10 ENCOUNTER — APPOINTMENT (OUTPATIENT)
Dept: PHYSICAL THERAPY | Facility: CLINIC | Age: 60
End: 2025-02-10
Payer: MEDICARE

## 2025-02-12 ENCOUNTER — APPOINTMENT (OUTPATIENT)
Dept: PHYSICAL THERAPY | Facility: CLINIC | Age: 60
End: 2025-02-12
Payer: MEDICARE

## 2025-02-17 ENCOUNTER — APPOINTMENT (OUTPATIENT)
Dept: PHYSICAL THERAPY | Facility: CLINIC | Age: 60
End: 2025-02-17
Payer: MEDICARE

## 2025-02-19 ENCOUNTER — APPOINTMENT (OUTPATIENT)
Dept: PHYSICAL THERAPY | Facility: CLINIC | Age: 60
End: 2025-02-19
Payer: MEDICARE

## 2025-02-24 ENCOUNTER — APPOINTMENT (OUTPATIENT)
Dept: UROLOGY | Facility: CLINIC | Age: 60
End: 2025-02-24
Payer: MEDICARE

## 2025-03-12 ENCOUNTER — OFFICE VISIT (OUTPATIENT)
Dept: ORTHOPEDIC SURGERY | Facility: CLINIC | Age: 60
End: 2025-03-12
Payer: MEDICARE

## 2025-03-12 DIAGNOSIS — S46.012D TRAUMATIC COMPLETE TEAR OF LEFT ROTATOR CUFF, SUBSEQUENT ENCOUNTER: Primary | ICD-10-CM

## 2025-03-12 PROCEDURE — 99212 OFFICE O/P EST SF 10 MIN: CPT | Performed by: STUDENT IN AN ORGANIZED HEALTH CARE EDUCATION/TRAINING PROGRAM

## 2025-03-12 NOTE — PROGRESS NOTES
History: Patient returns to the office status post L revision RCR on 3 months ago. Patient has been working with therapy and pain is well controlled. Denies numbness/tingling.     Past medical history, past surgical history, medications, allergies, family history, social history, and review of systems were reviewed today and have been documented separately in this encounter.   A 12 point review of systems was negative other than as stated in the HPI.    Physical Examination:    On exam of the left upper extremity, incisions are well healed, without significant erythema or drainage. Demonstrates full ROM of the elbow/wrist/hand. Neurovascularly intact throughout. AROM of the shoulder today to , Er 10.     Assessment: 3 months s/p L revision RCR     Plan: Patient progressing as expected after recent surgery. Continue to work with PT. We will see them back around 6 months postoperatively. All questions answered.     Dragon software was used to dictate this note, please be aware that minor errors in transcription may be present.    Nehemias Leach MD    Shoulder/Elbow Surgery  [facility] [place]

## 2025-04-01 ENCOUNTER — APPOINTMENT (OUTPATIENT)
Dept: UROLOGY | Facility: CLINIC | Age: 60
End: 2025-04-01
Payer: MEDICARE

## 2025-05-02 ENCOUNTER — TELEPHONE (OUTPATIENT)
Dept: PULMONOLOGY | Facility: CLINIC | Age: 60
End: 2025-05-02
Payer: MEDICARE

## 2025-05-05 ENCOUNTER — OFFICE VISIT (OUTPATIENT)
Dept: PULMONOLOGY | Facility: CLINIC | Age: 60
End: 2025-05-05
Payer: MEDICARE

## 2025-05-05 VITALS
OXYGEN SATURATION: 98 % | WEIGHT: 130 LBS | BODY MASS INDEX: 21.66 KG/M2 | SYSTOLIC BLOOD PRESSURE: 143 MMHG | RESPIRATION RATE: 20 BRPM | TEMPERATURE: 97.2 F | DIASTOLIC BLOOD PRESSURE: 82 MMHG | HEIGHT: 65 IN | HEART RATE: 66 BPM

## 2025-05-05 DIAGNOSIS — R91.1 LUNG NODULE: ICD-10-CM

## 2025-05-05 DIAGNOSIS — G47.34 NOCTURNAL HYPOXIA: ICD-10-CM

## 2025-05-05 DIAGNOSIS — J43.9 PULMONARY EMPHYSEMA, UNSPECIFIED EMPHYSEMA TYPE (MULTI): ICD-10-CM

## 2025-05-05 DIAGNOSIS — F17.219 CIGARETTE NICOTINE DEPENDENCE WITH NICOTINE-INDUCED DISORDER: ICD-10-CM

## 2025-05-05 DIAGNOSIS — J98.4 CAVITARY LESION OF LUNG: Primary | ICD-10-CM

## 2025-05-05 DIAGNOSIS — R06.09 DYSPNEA ON EXERTION: ICD-10-CM

## 2025-05-05 DIAGNOSIS — R63.4 WEIGHT LOSS: ICD-10-CM

## 2025-05-05 PROCEDURE — G2211 COMPLEX E/M VISIT ADD ON: HCPCS | Performed by: INTERNAL MEDICINE

## 2025-05-05 PROCEDURE — 99215 OFFICE O/P EST HI 40 MIN: CPT | Performed by: INTERNAL MEDICINE

## 2025-05-05 PROCEDURE — 3008F BODY MASS INDEX DOCD: CPT | Performed by: INTERNAL MEDICINE

## 2025-05-05 RX ORDER — FLUTICASONE FUROATE, UMECLIDINIUM BROMIDE AND VILANTEROL TRIFENATATE 200; 62.5; 25 UG/1; UG/1; UG/1
1 POWDER RESPIRATORY (INHALATION) DAILY
Qty: 28 EACH | Refills: 11 | Status: SHIPPED | OUTPATIENT
Start: 2025-05-05 | End: 2026-05-05

## 2025-05-05 RX ORDER — ALBUTEROL SULFATE 90 UG/1
2 AEROSOL, METERED RESPIRATORY (INHALATION) EVERY 4 HOURS PRN
Qty: 18 G | Refills: 11 | Status: SHIPPED | OUTPATIENT
Start: 2025-05-05

## 2025-05-05 RX ORDER — MONTELUKAST SODIUM 10 MG/1
10 TABLET ORAL NIGHTLY
Qty: 30 TABLET | Refills: 11 | Status: SHIPPED | OUTPATIENT
Start: 2025-05-05 | End: 2026-05-05

## 2025-05-05 ASSESSMENT — ENCOUNTER SYMPTOMS
BACK PAIN: 1
NECK PAIN: 1
SINUS PAIN: 0
LIGHT-HEADEDNESS: 1
NERVOUS/ANXIOUS: 0
EYE PAIN: 0
FREQUENCY: 0
CONFUSION: 0
PALPITATIONS: 0
SINUS PRESSURE: 0
FATIGUE: 1
EYE ITCHING: 0
CHEST TIGHTNESS: 0
CONSTIPATION: 0
MYALGIAS: 1
DIZZINESS: 1
FEVER: 0
DYSURIA: 0
COUGH: 1
DIARRHEA: 0
RHINORRHEA: 0
UNEXPECTED WEIGHT CHANGE: 0
EYE REDNESS: 0
HEMATURIA: 0
CHILLS: 0
VOMITING: 0
SORE THROAT: 0
HEADACHES: 0
WHEEZING: 1
SHORTNESS OF BREATH: 1
WOUND: 0
APPETITE CHANGE: 0
SEIZURES: 0
NAUSEA: 0
ABDOMINAL PAIN: 0
ARTHRALGIAS: 1
DYSPHORIC MOOD: 0

## 2025-05-05 ASSESSMENT — COPD QUESTIONNAIRES
QUESTION6_LEAVINGHOUSE: 2
QUESTION7_SLEEPQUALITY: 5 - I DON'T SLEEP SOUNDLY BECAUSE OF MY LUNG CONDITION
QUESTION4_WALKINCLINE: 5 - WHEN I WALK UP A HILL OR ONE FLIGHT OF STAIRS I AM VERY BREATHLESS
CAT_TOTALSCORE: 31
QUESTION2_CHESTPHLEGM: 3
QUESTION3_CHESTTIGHTNESS: 2
QUESTION1_COUGHFREQUENCY: 4
QUESTION8_ENERGYLEVEL: 5 - I HAVE NO ENERGY AT ALL
QUESTION5_HOMEACTIVITIES: 5 - I AM VERY LIMITED DOING ACTIVITIES AT HOME

## 2025-05-05 NOTE — PATIENT INSTRUCTIONS
Mr. Crawley,     It was a pleasure to talk to you today. We discussed the followings:     1. Lung mass/nodule: given it has minimal activity on PET scan and it was stable in size in your repeat chest CT for a while. However your repeat chest CT in 9/2022 showed enlarging nodule. You subsequently had a biopsy done that did not show any cancer. Your PET CT scan showed it is stable. To further evaluate please have a repeat chest CT of your chest done.     2. COPD: your breathing test showed that you have moderate COPD. Given your symptoms are not well controlled, I am stopping Symbicort and Spiriva and starting you on a new inhaler called Trelegy. Your six minute walk test result showed that you did not qualify for oxygen supplementation. Please continue Montelukast and Ventolin as needed.      3. Smoking: Please make every effort to quit smoking.      4. Chest pain: please make sure you are following up with a heart doctor.     5. Low oxygen level: please continue to wear your supplemental oxygen at nights.         Please return to the clinic in 3-6 months with the result of the chest CT.

## 2025-05-05 NOTE — PROGRESS NOTES
Subjective   59 YOM with h/o hernia, pulmonary emphysema/blebs s/p resection in 2000, now is being seen for lung nodules.   Patient had not seen a doctor in ten years until he saw Dr. Barboza. Had screening chest CT done in Sep 2020 that showed a ? lung nodules, subsequently had PET done that showed mild activity, for that is referred to my office. Patient subsequently had more work up done as detailed below.   Last seen 6 months ago, since then had two L shoulder surgeries.   Patient had nocturnal pulse oximetry done, qualified for nocturnal O2 therapy. Using it every night, with improvement of his symptoms.   Currently on Advair, Spiriva, Montelukast (not taking any of them) and albuterol. Continues to use his albuterol on several times a day. Of note currently is not taking any maintenance inhaler.   Overall is feeling the same/slightly worse than the last visit.   CC SOB, both at rest and with activity, but mostly activity. LEDEZMA after walking 50 feet without oxygen or climbing 10 steps. +ve orthopnea. +ve PND, currently 1-2 times a night. No LE edema. LEDEZMA started 2019, gradual onset, progressive. Associated with wheezing but no chest tightness. LEDEZMA stable/worse since the last visit.   +ve wheezing, several times a day. States wheezing improves with albuterol.  Wheezing also worse since the last visit.   +ve chronic cough, moderate to severe, mostly in the morning, productive of clear sputum, no h/o hemoptysis. Cough and sputum for years, now worse since the last visit.  Having problem falling sleep and staying sleep. +ve snoring, Does not feel rested in am.   Exposures: quit smoking 7/2021, but started again, then quit again 12/2023. Now smokes sporadically on average less than 1/4 PPD. Around 40-60 pack/year h/o smoking. 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   Review of Systems   Constitutional:  Positive for fatigue. Negative for appetite change, chills, fever and unexpected weight change.    HENT:  Negative for congestion, ear pain, postnasal drip, rhinorrhea, sinus pressure, sinus pain and sore throat.    Eyes:  Negative for pain, redness, itching and visual disturbance.   Respiratory:  Positive for cough, shortness of breath and wheezing. Negative for chest tightness.    Cardiovascular:  Negative for chest pain, palpitations and leg swelling.   Gastrointestinal:  Negative for abdominal pain, constipation, diarrhea, nausea and vomiting.   Genitourinary:  Negative for dysuria, frequency and hematuria.   Musculoskeletal:  Positive for arthralgias, back pain, myalgias and neck pain.   Skin:  Negative for pallor, rash and wound.   Neurological:  Positive for dizziness and light-headedness. Negative for seizures, syncope and headaches.   Psychiatric/Behavioral:  Negative for confusion and dysphoric mood. The patient is not nervous/anxious.       Current Outpatient Medications   Medication Instructions    albuterol 2.5 mg, nebulization, 4 times daily PRN    diazePAM (VALIUM) 2 mg, Daily PRN     mg tablet 1 tablet, 3 times daily (morning, midday, late afternoon)    montelukast (SINGULAIR) 10 mg, oral, Nightly, STOP IF MOOD CHANGES.    nicotine polacrilex (NICORETTE) 2 mg, Mouth/Throat, As needed    Spiriva with HandiHaler 18 mcg, inhalation, Daily RT, GO TO ER IF VISUAL/EYE PAIN OR URINARY RETENTION OR PALPITATIONS OR CHEST PAIN OR    Ventolin HFA 90 mcg/actuation inhaler INHALE 1 TO 2 PUFFS BY MOUTH EVERY 4 TO 6 HOURS AS NEEDED FOR WHEEZING OR SHORTNESS OF BREATH   Objective   Visit Vitals  /82   Pulse 66   Temp 36.2 °C (97.2 °F)   Resp 20   Physical Exam  Constitutional:       Appearance: Normal appearance.      Comments: Thin.    HENT:      Head: Normocephalic and atraumatic.      Nose:      Comments: On RA.      Mouth/Throat:      Mouth: Mucous membranes are moist.      Comments: Mallampati 2-3  Eyes:      Extraocular Movements: Extraocular movements intact.      Pupils: Pupils are equal,  round, and reactive to light.   Cardiovascular:      Rate and Rhythm: Normal rate and regular rhythm.      Heart sounds: Normal heart sounds. No murmur heard.     No friction rub. No gallop.   Pulmonary:      Effort: Pulmonary effort is normal. No respiratory distress.      Breath sounds: No wheezing or rales.      Comments: Clear lung fields b/l with fair/poor air entry.   Abdominal:      General: There is no distension.      Palpations: Abdomen is soft.      Tenderness: There is no abdominal tenderness. There is no guarding.   Musculoskeletal:         General: No swelling or tenderness. Normal range of motion.      Cervical back: Normal range of motion and neck supple. No rigidity or tenderness.      Right lower leg: No edema.      Left lower leg: No edema.   Lymphadenopathy:      Cervical: No cervical adenopathy.   Skin:     General: Skin is warm and dry.      Coloration: Skin is not jaundiced.   Neurological:      General: No focal deficit present.      Mental Status: He is alert and oriented to person, place, and time.      Cranial Nerves: No cranial nerve deficit.      Motor: No weakness.   Psychiatric:         Mood and Affect: Mood normal.         Behavior: Behavior normal.     Relevant Results  CAT from today 31.  No echo, imaging or other studies since the last visit.     The followings were reviewed during previous visits      I personally reviewed the images for the PET/CT from 6/2024 that showed:       Redemonstration of the thick-walled cavities of the right upper lobe exhibiting overall mild metabolic activity with more focal areas of further mild increased metabolic activity along its superolateral aspect and inferolateral aspect. Overall, findings are decreased in comparison the prior PET study. Findings are again suggestive of an inflammatory/infectious process. Malignant involvement is felt to be less likely, but cannot be definitively excluded. If there is a high clinical suspicion for a  neoplastic process, tissue sampling may be of value with attention to either the superolateral aspect or inferior medial aspect. No other definite areas of abnormal hypermetabolic tracer activity to  suggest an active neoplastic/metastatic process are identified.      I personally reviewed the images for the chest CT from 2023  that showed:  some progression of wall thickening at probable blebs and bulla at the right upper lung, likely due to infection such as aspergillosis or fungus. A cavitary malignant lesion is not excluded.  I personally reviewed the images for the following studies (official reads as below):    CT from 4/10/2023 that showed: interval decrease in RUL cavitary lesion   CT from 3/2023 that showed: interval decrease in the RUL cavitary lesion and subpleural mass like lesion   CT from 2022 that showed worsening of the RUL cavitary lesion   6 MWT from 2022 reviewed, walked 340/477 (55.2%), no desaturation on RA.   I personally interpreted the PFT from 2022 that showed: FEV1/FVC 39%, FEV1 40-->47% (19%) with bronchodilator response, TLC 87%, RV/%, DLCO 56-->70%.   Stress test in 10/2022 negative.  Sleep study result from 2022 reviewed that showed RDI 3% 18.4%, RDI 4% 9.4%, emelia O2 80s.  Assessment/Plan   59 YOM with h/o bullectomy, smoking, now is being seen for LEDEZMA and lung mass/nodule.      1. Lung mass/nodule: ? scar. Bullectomy both lungs in . 2.9 x 1.7 cm right apex mass/speculated and an 8 mm RUL nodule, both only mild uptake on PET. Patient is high risk given h/o smoking and recent h/o weight loss. Also father  of lung cancer. CT chest done, that showed stable nodule and likely scar tissue. Repeat chest CT in 2021 showed enlarging RUL nodule. PET increased uptake, s/p bronch + biopsy that showed chronic inflammation with occasional not necrotizing granuloma. Patient had repeat chest CT done in Sep 2022 that showed increased in size of the cavity.  Subsequently was referred to thoracic surgery who ordered a CT guided biopsy which was done in 11/2022. Cultures +ve for pseudomonas, but otherwise negative. Cytology negative. Pathology showed dense fibroadipose tissue with chronic inflammation. Was treated with Levaquin for 2 weeks. Repeat CTs in March and April 2023 showed continuous improvement. However CT in 11/2023 showed worsening RUL thickening of bullae concerning for infection and malignancy. Especially since he has lost weight and has worsening symptoms. Weight loss resolved now.    PET/CT of the chest done, overall low suspicious for malignancy.    Sputum for AFB, fungal and bacterial cultures ordered, but not collected yet.    For now we will monitor with yearly low dose CT    2. COPD: PFT showed moderate COPD. Significant air trapping, marked LEDEZMA, on LAMA + LABA + ICS and rescue inhaler. Rescue inhaler need is now very frequent. Symptoms slightly worse since the last visit. CAT today  31. Category B. Started on Anoro previously, but could not afford due to high Co-payment. Now back on Symbicort + Spiriva (ran out of it) and montelukast. Overall his COPD is slightly worse.          Continue inhalers, will change  Symbicort, Spiriva to Trelegy, and Singulair (all were re-ordered)   continue Ventolin as needed.    6 MWT done, no desaturation   nocturnal pulse O2 done, qualified for nocturnal oxygen, advised to continue to wear   pulmonary rehab referral done, refusing at this time.      3. Smoking: quit smoking July 2021, but started again. Tried nicotine patch without any effect. Quit again 12/2023. Now intermittently smokes, overall on average 1/4 a day.    smoking cessation done, total time 3 min.      4. LEDEZMA: likely from COPD, had cardiac work up done.    2D echocardiogram done, only showed dilated LA   stress test in 10/2022 also negative.    COPD management as above.     5. Weight loss: ? cause, pulmonary work up negative for cancer. Now overall  stable.    advised to discuss this with his primary care. Has not talked to them yet.       RTC in 3-6 months.      Sully Carlin MD

## 2025-05-29 ENCOUNTER — APPOINTMENT (OUTPATIENT)
Dept: RADIOLOGY | Facility: HOSPITAL | Age: 60
End: 2025-05-29
Payer: MEDICARE

## 2025-06-02 ENCOUNTER — TELEPHONE (OUTPATIENT)
Dept: UROLOGY | Facility: CLINIC | Age: 60
End: 2025-06-02
Payer: MEDICARE

## 2025-06-02 DIAGNOSIS — Z30.09 ENCOUNTER FOR VASECTOMY COUNSELING: Primary | ICD-10-CM

## 2025-06-02 RX ORDER — DIAZEPAM 2 MG/1
2 TABLET ORAL EVERY 8 HOURS PRN
Qty: 1 TABLET | Refills: 0 | Status: CANCELLED | OUTPATIENT
Start: 2025-06-02

## 2025-06-02 RX ORDER — DIAZEPAM 5 MG/1
5 TABLET ORAL
Qty: 1 TABLET | Refills: 0 | Status: SHIPPED | OUTPATIENT
Start: 2025-06-02 | End: 2025-06-02

## 2025-06-02 NOTE — TELEPHONE ENCOUNTER
Pt called in with questions about his vasectomy.     Spoke with pt about questions. Pt requested a prescription of Valium for the procedure stating that he will have his girlfriend to drive him to and from the appointment. Please advise.

## 2025-06-03 ENCOUNTER — APPOINTMENT (OUTPATIENT)
Dept: UROLOGY | Facility: CLINIC | Age: 60
End: 2025-06-03
Payer: MEDICARE

## 2025-06-06 ENCOUNTER — APPOINTMENT (OUTPATIENT)
Dept: RADIOLOGY | Facility: HOSPITAL | Age: 60
End: 2025-06-06
Payer: MEDICARE

## 2025-06-06 DIAGNOSIS — F17.219 CIGARETTE NICOTINE DEPENDENCE WITH NICOTINE-INDUCED DISORDER: ICD-10-CM

## 2025-06-06 PROCEDURE — 71271 CT THORAX LUNG CANCER SCR C-: CPT

## 2025-06-08 NOTE — PROGRESS NOTES
Alber presents as a new patient to discuss vasectomy.   The patient’s EMR has been reviewed.  Lives in [place], [place].  Occupation: ***  Status: *** (?)  Children: ***    History Of Present Illness [HPI]:  Denies wanting any further children in the future.   Acknowledges mutual agreement with Partner.  Denies any testicular pain or scrotal swelling.  Denies any urinary issues or sexual health concerns.     PMH: COPD, emphysema, lung nodule, SCOTT  PSH: Inguinal hernia repair, knee surgery, lobectomy (blebs s/p resection).   FH: Denies FH of  malignancy.  SH: +Smoker, hx of ETOH abuse.     Medical History[1]  Surgical History[2]  Family History[3]  Tobacco Use History[4]  Current Medications[5]  Allergies[6]  Past medical, surgical, family and social history in the chart was reviewed and is accurate including any additions to what is in this HPI.    REVIEW OF SYSTEMS [ROS]:  Constitutional: denies any unintentional weight loss or change in strength.  Integumentary: denies any rashes or pruritus.  Eyes: denies any double vision or eye pain.  Ear/Nose/Mouth/Throat: denies any nosebleeds or gum bleeds.  Cardiovascular: denies any chest pain or syncope.  Respiratory: denies hemoptysis.  Gastrointestinal: denies nausea or vomiting.  Musculoskeletal: denies muscle cramping or weakness.  Neurologic: denies convulsions or seizures.  Hematologic/Lymphatic: denies bleeding tendencies.  Endocrine: denies heat/cold intolerance.  All other systems have been reviewed and are negative unless otherwise noted in the HPI.     OBJECTIVE:  There were no vitals taken for this visit.  PHYSICAL EXAM:  Constitutional: No obvious distress.  Eyes: Non-injected conjunctiva, sclera clear, EOMI.  Ears/Nose/Mouth/Throat: No obvious drainage per ears or nose.  Cardiovascular: Extremities are warm and well perfused. No edema, cyanosis or pallor.  Respiratory: No audible wheezing/stridor; respirations do not appear  labored.  Gastrointestinal: Abdomen soft, not distended.  Musculoskeletal: Normal ROM of extremities.  Skin: No obvious rashes or open sores.  Neurologic: Alert and oriented, CN 2-12 grossly intact.  Psychiatric: Answers questions appropriately with normal affect.  Hematologic/Lymphatic/Immunologic: No obvious bruises or sites of spontaneous bleeding.  Genitourinary: No CVA tenderness, bladder not palpable.   Vas deferens palpated bilaterally.        ASSESSMENT/PLAN:  Problem List Items Addressed This Visit    None     Vasectomy Counseling:  Vasectomy is intended to be a permanent form of contraception.  Vasectomy does not produce immediate sterility.  Following vasectomy, another form of contraception is required until vas occlusion is confirmed by post-vasectomy semen analysis (PVSA).  Even after vas occlusion is confirmed, vasectomy is not 100% reliable in preventing pregnancy.  The risk of pregnancy after vasectomy is approximately 1 in 2,000 for men who have post-vasectomy azoospermia or PVSA showing rare non-motile sperm (RNMS).  Repeat vasectomy is necessary in <1% of vasectomies, provided that a technique for vas occlusion known to have a low occlusive failure rate has been used.  Patient should refrain from ejaculation for approximately one week after vasectomy.  Options for fertility after vasectomy reversal and sperm retrieval with in vitro fertilization. These options are not always successful, and they may be expensive.    Patient understand that he must have protected intercourse after the procedure (vasectomy) to the point where he is able to provide a negative semen sample. He may stop using other methods of ocntraception when examination of one well-mixed, uncentrifuged, fresh post-vasectomy semen specimen shows azoospermia or only rare non-motile sperm (RNMS or <100,000 non-motile sperm/mL).    Will proceed with vasectomy in clinic under local.  Rx for valium sent to the patient pharmacy, to be  taken morning of the procedure.   All questions were answered to the patient’s satisfaction.  Patient agrees with the plan and wishes to proceed.    Scribed for Dr. Mark Cespedes by Raul Douglas.  I, Dr. Mark Cespedes, have personally reviewed and agreed with the information entered by the Virtual Scribe. 06/08/25       [1]   Past Medical History:  Diagnosis Date    Bleb, lung (Multi)     s/p resection in 2000    Chronic hypoxic respiratory failure, on home oxygen therapy 11/18/2024    Cigarette smoker     COPD (chronic obstructive pulmonary disease) (Multi)     Emphysema lung (Multi)     pulmonary, home O2    H/O alcohol abuse     Lung nodule     Nocturnal hypoxemia     Wears O2 QHS and during day 89% RA, 92% on 2LNC    SCOTT (obstructive sleep apnea) 10/27/2023    Requires continuous at home supplemental oxygen     2.5 L QHS   [2]   Past Surgical History:  Procedure Laterality Date    BRONCHOSCOPY  09/2022    CARDIOVASCULAR STRESS TEST  10/14/2022    IMPRESSION: 1. There is no evidence of ischemia or prior infarction. 2. The left ventricle is normal in size. 3. Normal LV wall motion with stress LV EF estimated at 52%.    CT GUIDED PERCUTANEOUS BIOPSY LUNG  11/10/2022    CT GUIDED PERCUTANEOUS BIOPSY LUNG 11/10/2022 [facility]    ECHOCARDIOGRAM 2 D M MODE PANEL  05/27/2021    CONCLUSIONS:  1. The left ventricular systolic function is normal with a 55-60% estimated ejection fraction.  2. The left atrium is moderate to severely dilated.    HERNIA REPAIR      inguinal    KNEE SURGERY Right 1994    LUNG REMOVAL, PARTIAL  2000    blebs s/p resection   [3]   Family History  Problem Relation Name Age of Onset    No Known Problems Mother      Lung cancer Father      Accidental death Brother     [4]   Social History  Tobacco Use   Smoking Status Some Days    Current packs/day: 1.00    Average packs/day: 1 pack/day for 40.0 years (40.0 ttl pk-yrs)    Types: Cigarettes   Smokeless Tobacco Never   Tobacco Comments    10  cigs/day trying to quit    [5]   Current Outpatient Medications   Medication Sig Dispense Refill    albuterol 2.5 mg /3 mL (0.083 %) nebulizer solution Take 3 mL (2.5 mg) by nebulization 4 times a day as needed for wheezing or shortness of breath. 75 mL 3    diazePAM (Valium) 5 mg tablet Take 1 tablet (5 mg) by mouth 1 time if needed for anxiety for up to 1 dose. 1 tablet 0    fluticasone-umeclidin-vilanter (Trelegy Ellipta) 200-62.5-25 mcg blister with device Inhale 1 puff once daily. 28 each 11     mg tablet Take 1 tablet (800 mg) by mouth 3 times daily (morning, midday, late afternoon). prn      montelukast (Singulair) 10 mg tablet Take 1 tablet (10 mg) by mouth once daily at bedtime. STOP IF MOOD CHANGES. 30 tablet 11    Ventolin HFA 90 mcg/actuation inhaler Inhale 2 puffs every 4 hours if needed for wheezing or shortness of breath. 18 g 11     No current facility-administered medications for this visit.   [6] No Known Allergies

## 2025-06-09 ENCOUNTER — APPOINTMENT (OUTPATIENT)
Dept: UROLOGY | Facility: CLINIC | Age: 60
End: 2025-06-09
Payer: MEDICARE

## 2025-06-11 ENCOUNTER — APPOINTMENT (OUTPATIENT)
Dept: ORTHOPEDIC SURGERY | Facility: CLINIC | Age: 60
End: 2025-06-11
Payer: MEDICARE

## 2025-06-11 DIAGNOSIS — S46.012D TRAUMATIC COMPLETE TEAR OF LEFT ROTATOR CUFF, SUBSEQUENT ENCOUNTER: Primary | ICD-10-CM

## 2025-06-11 NOTE — PROGRESS NOTES
History: Patient returns to the office status post L revision RCR on 6 months ago. Patient has been working with therapy and pain is well controlled. Denies numbness/tingling.     Past medical history, past surgical history, medications, allergies, family history, social history, and review of systems were reviewed today and have been documented separately in this encounter.   A 12 point review of systems was negative other than as stated in the HPI.    Physical Examination:    On exam of the left upper extremity, incisions are well healed, without significant erythema or drainage. Demonstrates full ROM of the elbow/wrist/hand. Neurovascularly intact throughout. AROM of the shoulder today to ***.    Assessment: 6 months s/p L revision RCR     Plan: Patient progressing as expected after surgery. ***     All questions answered.     Dragon software was used to dictate this note, please be aware that minor errors in transcription may be present.    Nehemias Leach MD    Shoulder/Elbow Surgery  [facility]

## 2025-07-11 ENCOUNTER — TELEPHONE (OUTPATIENT)
Dept: SLEEP MEDICINE | Facility: HOSPITAL | Age: 60
End: 2025-07-11
Payer: MEDICARE

## 2025-07-11 DIAGNOSIS — J43.9 PULMONARY EMPHYSEMA, UNSPECIFIED EMPHYSEMA TYPE (MULTI): ICD-10-CM

## 2025-07-17 RX ORDER — ALBUTEROL SULFATE 0.83 MG/ML
2.5 SOLUTION RESPIRATORY (INHALATION) 4 TIMES DAILY PRN
Qty: 75 ML | Refills: 3 | Status: SHIPPED | OUTPATIENT
Start: 2025-07-17

## 2025-08-04 ENCOUNTER — TELEPHONE (OUTPATIENT)
Dept: GASTROENTEROLOGY | Facility: CLINIC | Age: 60
End: 2025-08-04
Payer: MEDICARE

## 2025-08-04 DIAGNOSIS — Z12.11 COLON CANCER SCREENING: Primary | ICD-10-CM

## 2025-08-04 RX ORDER — POLYETHYLENE GLYCOL 3350 17 G/17G
POWDER, FOR SOLUTION ORAL
Qty: 238 G | Refills: 0 | Status: SHIPPED | OUTPATIENT
Start: 2025-08-04

## 2025-08-04 NOTE — TELEPHONE ENCOUNTER
Patient is having a colonoscopy with Dr. Jarrell on 8/15. Can you please send Miralax to the patients pharmacy for bowel prep?  [facility]

## 2025-08-15 ENCOUNTER — HOSPITAL ENCOUNTER (OUTPATIENT)
Dept: GASTROENTEROLOGY | Facility: HOSPITAL | Age: 60
Discharge: HOME | End: 2025-08-15
Payer: MEDICARE

## 2025-08-15 ENCOUNTER — ANESTHESIA EVENT (OUTPATIENT)
Dept: GASTROENTEROLOGY | Facility: HOSPITAL | Age: 60
End: 2025-08-15
Payer: MEDICARE

## 2025-08-15 ENCOUNTER — ANESTHESIA (OUTPATIENT)
Dept: GASTROENTEROLOGY | Facility: HOSPITAL | Age: 60
End: 2025-08-15
Payer: MEDICARE

## 2025-08-15 VITALS
HEIGHT: 65 IN | RESPIRATION RATE: 20 BRPM | DIASTOLIC BLOOD PRESSURE: 83 MMHG | TEMPERATURE: 97.2 F | WEIGHT: 121.25 LBS | OXYGEN SATURATION: 98 % | SYSTOLIC BLOOD PRESSURE: 143 MMHG | HEART RATE: 71 BPM | BODY MASS INDEX: 20.2 KG/M2

## 2025-08-15 DIAGNOSIS — Z13.9 SCREENING DUE: ICD-10-CM

## 2025-08-15 PROBLEM — Z99.81 HISTORY OF HOME OXYGEN THERAPY: Status: ACTIVE | Noted: 2025-08-15

## 2025-08-15 PROCEDURE — 3700000002 HC GENERAL ANESTHESIA TIME - EACH INCREMENTAL 1 MINUTE

## 2025-08-15 PROCEDURE — 45385 COLONOSCOPY W/LESION REMOVAL: CPT | Performed by: INTERNAL MEDICINE

## 2025-08-15 PROCEDURE — 7100000010 HC PHASE TWO TIME - EACH INCREMENTAL 1 MINUTE

## 2025-08-15 PROCEDURE — 3700000001 HC GENERAL ANESTHESIA TIME - INITIAL BASE CHARGE

## 2025-08-15 PROCEDURE — 2500000004 HC RX 250 GENERAL PHARMACY W/ HCPCS (ALT 636 FOR OP/ED): Performed by: NURSE ANESTHETIST, CERTIFIED REGISTERED

## 2025-08-15 PROCEDURE — 2720000007 HC OR 272 NO HCPCS

## 2025-08-15 PROCEDURE — 7100000009 HC PHASE TWO TIME - INITIAL BASE CHARGE

## 2025-08-15 RX ORDER — MIDAZOLAM HYDROCHLORIDE 1 MG/ML
INJECTION, SOLUTION INTRAMUSCULAR; INTRAVENOUS AS NEEDED
Status: DISCONTINUED | OUTPATIENT
Start: 2025-08-15 | End: 2025-08-15

## 2025-08-15 RX ORDER — PROPOFOL 10 MG/ML
INJECTION, EMULSION INTRAVENOUS AS NEEDED
Status: DISCONTINUED | OUTPATIENT
Start: 2025-08-15 | End: 2025-08-15

## 2025-08-15 RX ORDER — SODIUM CHLORIDE 0.9 % (FLUSH) 0.9 %
SYRINGE (ML) INJECTION AS NEEDED
Status: DISCONTINUED | OUTPATIENT
Start: 2025-08-15 | End: 2025-08-15

## 2025-08-15 RX ADMIN — MIDAZOLAM HYDROCHLORIDE 2 MG: 1 INJECTION, SOLUTION INTRAMUSCULAR; INTRAVENOUS at 12:37

## 2025-08-15 RX ADMIN — Medication 10 ML: at 12:37

## 2025-08-15 RX ADMIN — PROPOFOL 50 MG: 10 INJECTION, EMULSION INTRAVENOUS at 12:40

## 2025-08-15 RX ADMIN — PROPOFOL 200 MCG/KG/MIN: 10 INJECTION, EMULSION INTRAVENOUS at 12:41

## 2025-08-15 RX ADMIN — Medication 10 ML: at 12:56

## 2025-08-15 RX ADMIN — PROPOFOL 40 MG: 10 INJECTION, EMULSION INTRAVENOUS at 12:44

## 2025-08-15 ASSESSMENT — PAIN SCALES - GENERAL
PAINLEVEL_OUTOF10: 0 - NO PAIN

## 2025-08-15 ASSESSMENT — PAIN - FUNCTIONAL ASSESSMENT: PAIN_FUNCTIONAL_ASSESSMENT: 0-10

## 2025-08-22 LAB
LABORATORY COMMENT REPORT: NORMAL
PATH REPORT.FINAL DX SPEC: NORMAL
PATH REPORT.GROSS SPEC: NORMAL
PATH REPORT.MICROSCOPIC SPEC OTHER STN: NORMAL
PATH REPORT.RELEVANT HX SPEC: NORMAL
PATH REPORT.TOTAL CANCER: NORMAL

## 2025-08-26 DIAGNOSIS — F17.219 CIGARETTE NICOTINE DEPENDENCE WITH NICOTINE-INDUCED DISORDER: Primary | ICD-10-CM

## (undated) DEVICE — PAD, GROUNDING, ELECTROSURGICAL, W/9 FT CABLE, POLYHESIVE II, ADULT, LF

## (undated) DEVICE — TUBING, PATIENT 8FT STERILE

## (undated) DEVICE — NEEDLE, SCORPION, HD

## (undated) DEVICE — PROBE, APOLLO RF, 90 DEG, EXTRA LARTGE

## (undated) DEVICE — DRAPE, SHEET, 17 X 23 IN

## (undated) DEVICE — MAT, FLOOR, SURGISAFE, FLUID CONTROL, 46X40

## (undated) DEVICE — ADAPTER, Y TUBING STERILE

## (undated) DEVICE — Device

## (undated) DEVICE — DRESSING, GAUZE, PETROLATUM, PATCH, XEROFORM, 1 X 8 IN, STERILE

## (undated) DEVICE — SUTURE, ETHILON, 3-0, 30 IN, FS-1, BLACK

## (undated) DEVICE — GLOVE, SURGICAL, PROTEXIS PI BLUE W/NEUTHERA, 7.5, PF, LF

## (undated) DEVICE — TUBING, DUAL WAVE, OUTFLOW

## (undated) DEVICE — KIT, BEACH CHAIR TRIMANO

## (undated) DEVICE — DRAPE, INSTRUMENT, W/POUCH, STERI DRAPE, 7 X 11 IN, DISPOSABLE, STERILE

## (undated) DEVICE — TUBING, PUMP REDEUCE 8FT STERILE

## (undated) DEVICE — GLOVE, SURGICAL, PROTEXIS PI MICRO, 7.0, PF, LF

## (undated) DEVICE — MASK, FACE, TENET, FOAM POSITIONING, DISPOSABLE

## (undated) DEVICE — CANNULA, ARTHROSCOPIC TWIST-IN 7MM

## (undated) DEVICE — CANNULA, ARTHROSCOPIC CRYSTAL 5.75MM

## (undated) DEVICE — GLOVE, SURGEON, PREMIERPRO PI, SZ-7.0, PF, WH

## (undated) DEVICE — KIT, DISPOSABLES, FOR 2.6 FIBERTAK